# Patient Record
Sex: MALE | Race: WHITE | NOT HISPANIC OR LATINO | URBAN - METROPOLITAN AREA
[De-identification: names, ages, dates, MRNs, and addresses within clinical notes are randomized per-mention and may not be internally consistent; named-entity substitution may affect disease eponyms.]

---

## 2020-12-15 RX ORDER — LISINOPRIL 20 MG/1
20 TABLET ORAL EVERY MORNING
COMMUNITY

## 2020-12-15 RX ORDER — GLUC/MSM/COLGN2/HYAL/ANTIARTH3 375-375-20
TABLET ORAL DAILY
COMMUNITY
End: 2020-12-16 | Stop reason: ENTERED-IN-ERROR

## 2020-12-15 RX ORDER — DONEPEZIL HYDROCHLORIDE 10 MG/1
10 TABLET, FILM COATED ORAL
COMMUNITY

## 2020-12-15 RX ORDER — ROSUVASTATIN CALCIUM 40 MG/1
40 TABLET, COATED ORAL
COMMUNITY

## 2020-12-15 RX ORDER — AMITRIPTYLINE HYDROCHLORIDE 100 MG/1
100 TABLET ORAL NIGHTLY
COMMUNITY

## 2020-12-15 RX ORDER — ASPIRIN 81 MG/1
81 TABLET ORAL DAILY
Status: ON HOLD | COMMUNITY
End: 2020-12-22 | Stop reason: SDUPTHER

## 2020-12-15 RX ORDER — MEMANTINE HYDROCHLORIDE 5 MG/1
5 TABLET ORAL 2 TIMES DAILY
COMMUNITY

## 2020-12-15 RX ORDER — GABAPENTIN 400 MG/1
400 CAPSULE ORAL 3 TIMES DAILY
COMMUNITY

## 2020-12-15 RX ORDER — EZETIMIBE 10 MG/1
10 TABLET ORAL DAILY
COMMUNITY

## 2020-12-15 RX ORDER — SUCRALFATE 1 G/1
1 TABLET ORAL 4 TIMES DAILY
COMMUNITY

## 2020-12-15 RX ORDER — OMEPRAZOLE 20 MG/1
20 CAPSULE, DELAYED RELEASE ORAL
COMMUNITY

## 2020-12-15 RX ORDER — LEVOTHYROXINE SODIUM 75 UG/1
75 TABLET ORAL
COMMUNITY
End: 2020-12-16 | Stop reason: ENTERED-IN-ERROR

## 2020-12-15 RX ORDER — HYDROCODONE BITARTRATE AND ACETAMINOPHEN 10; 325 MG/1; MG/1
1 TABLET ORAL EVERY 4 HOURS PRN
COMMUNITY
End: 2020-12-16 | Stop reason: ENTERED-IN-ERROR

## 2020-12-15 RX ORDER — DULOXETIN HYDROCHLORIDE 30 MG/1
30 CAPSULE, DELAYED RELEASE ORAL 2 TIMES DAILY
COMMUNITY

## 2020-12-15 RX ORDER — CLOPIDOGREL BISULFATE 75 MG/1
75 TABLET ORAL DAILY
Status: ON HOLD | COMMUNITY
End: 2020-12-22 | Stop reason: SDUPTHER

## 2020-12-15 RX ORDER — GLIMEPIRIDE 4 MG/1
4 TABLET ORAL
COMMUNITY

## 2020-12-16 ENCOUNTER — APPOINTMENT (OUTPATIENT)
Dept: LAB | Facility: HOSPITAL | Age: 80
End: 2020-12-16
Attending: ORTHOPAEDIC SURGERY
Payer: MEDICARE

## 2020-12-16 ENCOUNTER — APPOINTMENT (OUTPATIENT)
Dept: PREADMISSION TESTING | Facility: HOSPITAL | Age: 80
End: 2020-12-16
Attending: ORTHOPAEDIC SURGERY
Payer: MEDICARE

## 2020-12-16 ENCOUNTER — TRANSCRIBE ORDERS (OUTPATIENT)
Dept: PREADMISSION TESTING | Facility: HOSPITAL | Age: 80
End: 2020-12-16

## 2020-12-16 VITALS
SYSTOLIC BLOOD PRESSURE: 140 MMHG | OXYGEN SATURATION: 99 % | TEMPERATURE: 97 F | WEIGHT: 193.9 LBS | RESPIRATION RATE: 18 BRPM | HEART RATE: 70 BPM | BODY MASS INDEX: 31.16 KG/M2 | HEIGHT: 66 IN | DIASTOLIC BLOOD PRESSURE: 68 MMHG

## 2020-12-16 DIAGNOSIS — Z11.59 ENCOUNTER FOR SCREENING FOR OTHER VIRAL DISEASES: Primary | ICD-10-CM

## 2020-12-16 DIAGNOSIS — I10 ESSENTIAL HYPERTENSION: ICD-10-CM

## 2020-12-16 DIAGNOSIS — Z11.59 ENCOUNTER FOR SCREENING FOR OTHER VIRAL DISEASES: ICD-10-CM

## 2020-12-16 DIAGNOSIS — E78.49 OTHER HYPERLIPIDEMIA: ICD-10-CM

## 2020-12-16 DIAGNOSIS — M47.12 OTHER SPONDYLOSIS WITH MYELOPATHY, CERVICAL REGION: ICD-10-CM

## 2020-12-16 DIAGNOSIS — M48.02 SPINAL STENOSIS, CERVICAL REGION: ICD-10-CM

## 2020-12-16 DIAGNOSIS — E74.819 DISORDERS OF GLUCOSE TRANSPORT, UNSPECIFIED (CMS/HCC): ICD-10-CM

## 2020-12-16 DIAGNOSIS — Z01.818 ENCOUNTER FOR OTHER PREPROCEDURAL EXAMINATION: ICD-10-CM

## 2020-12-16 DIAGNOSIS — R73.9 HYPERGLYCEMIA: ICD-10-CM

## 2020-12-16 DIAGNOSIS — Z01.818 PREOP EXAMINATION: Primary | ICD-10-CM

## 2020-12-16 PROBLEM — R41.3 MEMORY DISORDER: Status: ACTIVE | Noted: 2020-12-16

## 2020-12-16 PROBLEM — R73.09 ELEVATED HEMOGLOBIN A1C: Status: ACTIVE | Noted: 2020-12-16

## 2020-12-16 PROBLEM — Z86.73 HISTORY OF CVA (CEREBROVASCULAR ACCIDENT): Status: ACTIVE | Noted: 2020-12-16

## 2020-12-16 PROBLEM — E11.9 DM (DIABETES MELLITUS) (CMS/HCC): Status: ACTIVE | Noted: 2020-12-16

## 2020-12-16 PROBLEM — E78.5 HYPERLIPIDEMIA: Status: ACTIVE | Noted: 2020-12-16

## 2020-12-16 LAB
ABO + RH BLD: NORMAL
ANION GAP SERPL CALC-SCNC: 7 MEQ/L (ref 3–15)
BLD GP AB SCN SERPL QL: NEGATIVE
BLOOD BANK CMNT PATIENT-IMP: NORMAL
BUN SERPL-MCNC: 27 MG/DL (ref 8–20)
CALCIUM SERPL-MCNC: 9.5 MG/DL (ref 8.9–10.3)
CHLORIDE SERPL-SCNC: 100 MEQ/L (ref 98–109)
CO2 SERPL-SCNC: 29 MEQ/L (ref 22–32)
CREAT SERPL-MCNC: 1.1 MG/DL (ref 0.8–1.3)
D AG BLD QL: POSITIVE
ERYTHROCYTE [DISTWIDTH] IN BLOOD BY AUTOMATED COUNT: 13.2 % (ref 11.6–14.4)
EST. AVERAGE GLUCOSE BLD GHB EST-MCNC: 194 MG/DL
GFR SERPL CREATININE-BSD FRML MDRD: >60 ML/MIN/1.73M*2
GLUCOSE SERPL-MCNC: 416 MG/DL (ref 70–99)
HBA1C MFR BLD HPLC: 8.4 %
HCT VFR BLDCO AUTO: 43.4 % (ref 40.1–51)
HGB BLD-MCNC: 14 G/DL (ref 13.7–17.5)
LABORATORY COMMENT REPORT: NORMAL
MCH RBC QN AUTO: 30.2 PG (ref 28–33.2)
MCHC RBC AUTO-ENTMCNC: 32.3 G/DL (ref 32.2–36.5)
MCV RBC AUTO: 93.5 FL (ref 83–98)
PDW BLD AUTO: 10.1 FL (ref 9.4–12.4)
PLATELET # BLD AUTO: 216 K/UL (ref 150–350)
POTASSIUM SERPL-SCNC: 4.7 MEQ/L (ref 3.6–5.1)
RBC # BLD AUTO: 4.64 M/UL (ref 4.5–5.8)
SODIUM SERPL-SCNC: 136 MEQ/L (ref 136–144)
SPECIMEN EXP DATE BLD: NORMAL
WBC # BLD AUTO: 8.19 K/UL (ref 3.8–10.5)

## 2020-12-16 PROCEDURE — 83036 HEMOGLOBIN GLYCOSYLATED A1C: CPT

## 2020-12-16 PROCEDURE — 85027 COMPLETE CBC AUTOMATED: CPT

## 2020-12-16 PROCEDURE — 99215 OFFICE O/P EST HI 40 MIN: CPT | Performed by: HOSPITALIST

## 2020-12-16 PROCEDURE — U0003 INFECTIOUS AGENT DETECTION BY NUCLEIC ACID (DNA OR RNA); SEVERE ACUTE RESPIRATORY SYNDROME CORONAVIRUS 2 (SARS-COV-2) (CORONAVIRUS DISEASE [COVID-19]), AMPLIFIED PROBE TECHNIQUE, MAKING USE OF HIGH THROUGHPUT TECHNOLOGIES AS DESCRIBED BY CMS-2020-01-R: HCPCS

## 2020-12-16 PROCEDURE — 36415 COLL VENOUS BLD VENIPUNCTURE: CPT

## 2020-12-16 PROCEDURE — 80048 BASIC METABOLIC PNL TOTAL CA: CPT

## 2020-12-16 PROCEDURE — 86900 BLOOD TYPING SEROLOGIC ABO: CPT

## 2020-12-16 RX ORDER — METOPROLOL SUCCINATE 50 MG/1
50 TABLET, EXTENDED RELEASE ORAL
COMMUNITY

## 2020-12-16 SDOH — HEALTH STABILITY: MENTAL HEALTH: HOW OFTEN DO YOU HAVE A DRINK CONTAINING ALCOHOL?: NEVER

## 2020-12-16 ASSESSMENT — PAIN SCALES - GENERAL: PAINLEVEL: 3

## 2020-12-16 NOTE — ASSESSMENT & PLAN NOTE
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   Pt had been on plavix/asa and plavix was stopped as of yesterday in preparation for the surgery  We will need to obtain clearance letter from dr. Aimee Chavez pt's neurologist before proceeding w surgery. I d/w pt + pt's daughter + RN

## 2020-12-16 NOTE — PRE-PROCEDURE INSTRUCTIONS
1. Admissions will call you with your arrival time on 12/18/2020 (day prior to surgery) between 2pm - 4pm. For questions about your arrival time, please call [phone].    2. Please report to the [facility] in the [place] on the day of your procedure. Enter the hospital through the [facility] (main entrance at [address], [place]). If you are parking in the [facility], come to the ground floor of the garage and follow signs to the Main Hospital. Bring your insurance card and photo ID.     3. Please follow these fasting guidelines:  - STOP all solid food 8 hours prior to arrival.   - No more than 12oz of water is permitted and must STOP 2 HOURS prior to arrival to the hospital.     4. Early on the morning of the procedure, please take the following medications listed below with a sip of water, in addition to any medications prescribed by your surgeon: Memantine, Sucralfate, Gabapentin, Omeprazole, Duloxetine    *NO aspirin, ibuprofen, anti-inflammatories, fish oil or Vitamin E unless ordered by physician.    *Stopped taking Aspirin and Plavix 12/15/2020 per pts daughter(Dr. Delcid aware per daughter)     5. Other instructions: Hibiclens shower x 2(no face or genital area), brush teeth    6. If you develop a cough, cold, fever, or other symptom prior to the date of the procedure, please report it to your physician immediately.    7. If you need to cancel the procedure for any reason, please contact your physician.    8. Make arrangements to have someone drive you home from the procedure. If you have not arranged for transportation home, your surgery may be cancelled.     9. You may not take public transportation or or a cab unless accompanied by a responsible person.    10. You may not drive a car or operate complex or potentially dangerous machinery for 24 hours following anesthesia and/or sedation.    11. If it is medically necessary for you to have a longer stay, you  will be informed as soon as the decision is made.    12. Do not wear or bring anything of value to the hospital, including medication or jewelry of any kind. Do not wear make-up or contact lenses. Do bring your glasses and hearing aid, with a case. If you use a CPAP machine and will be overnight, please bring it with you. If you use any inhalers, please bring them as well.     13. Dress in comfortable clothes.    14. If instructed, please bring a copy of your Advance Directive (Living Will/Durable Power of ) on the day of your procedure.    Pre operative instructions given as per protocol.  Form explained by:

## 2020-12-16 NOTE — ASSESSMENT & PLAN NOTE
Pt's hgba1c back in 6/2020 was 8.1 per pt's PCP -dr. Travis Nieves  hgb a1c is being repeated and is currently pending.

## 2020-12-16 NOTE — ASSESSMENT & PLAN NOTE
Pt's blood sugar today is 416. I called and informed the pt's daughter (pt did not have his phone w him). Instructed her to have pt see Dr. Travis Rich ASAP. Pt's daughter is a nurse and understands that pt will need to be seen by his PCP as soon as possible. Also informed her that the pt's surgery will need to be delayed for the pt's blood sugar to be better controlled before proceeding w surgery. I informed our RN who will notify dr. Delcid's office that the surgery will need to be delayed. I also notified pt's PCP -dr. Travis Rich service and the office indicated that they are currently in the process of following up with the pt regarding his blood sugar and see the pt in the office soon to address his uncontrolled blood sugar.

## 2020-12-16 NOTE — H&P (VIEW-ONLY)
[facility] Medicine Service -  Pre-Operative Consultation       Patient Name: Reji Salvador  Referring Surgeon: dr. boyce    Reason for Referral: Pre-Operative Evaluation  Surgical Procedure: Cervical C3-5 Anterior Decompression, Instrumented Fusion, Autograft, Allograft, Neuromonitoring  Operative Date: 12/21/20  Other Providers:      PCP: Florinda Nieves MD        HISTORY OF PRESENT ILLNESS      Reji Salvador is a 80 y.o. male presenting today to the [facility] at [facility] for pre-operative evaluation prior to planned surgery.  Pt has had paresthesia in b/l UE. After eval, pt found to have c-spine issues. Pt has tried conservative management such as injection therapy without success. Pt has now opted for surgical intervention.     The patient denies any current or recent chest pain or pressure, dyspnea, cough, sputum, fevers, chills, abdominal pain, nausea, vomiting, diarrhea, urinary complaints or blood in stool.     Functionally, the patient is able to ascend a flight or so of stairs with no dyspnea or chest pain.     No history of MI, arrhythmia,or CHF.  No history of RUDY.  No history of DVT/PE.  No history of COPD.  + history of CVA.  No history of DM.   No history of CKD.     PAST MEDICAL AND SURGICAL HISTORY      Past Medical History:   Diagnosis Date   • Bilateral hand pain    • Cervical radiculopathy    • Coronary atherosclerosis    • Fall 09/2020   • Fibromyalgia    • GERD (gastroesophageal reflux disease)    • [place] (hard of hearing)     B/L aids   • Hx of radiation therapy    • Hypertension    • Hypothyroidism    • Lipid disorder    • Memory impairment    • Numbness and tingling in both hands    • Osteoarthritis    • Prostate cancer (CMS/HCC)    • Spinal stenosis in cervical region    • Transient cerebral ischemia    • Tremor    • Type 2 diabetes mellitus (CMS/HCC)     no accuchecks at home       Past Surgical History:   Procedure Laterality Date   •  ANKLE FRACTURE SURGERY Left 09/2020   • CATARACT EXTRACTION W/  INTRAOCULAR LENS IMPLANT Bilateral    • COLONOSCOPY     • TONSILLECTOMY         MEDICATIONS        Current Outpatient Medications:   •  metoprolol succinate XL (TOPROL-XL) 50 mg 24 hr tablet, Take 50 mg by mouth daily with dinner., Disp: , Rfl:   •  amitriptyline (ELAVIL) 100 mg tablet, Take 100 mg by mouth nightly.  , Disp: , Rfl:   •  aspirin 81 mg enteric coated tablet, Take 81 mg by mouth daily., Disp: , Rfl:   •  clopidogreL (PLAVIX) 75 mg tablet, Take 75 mg by mouth daily., Disp: , Rfl:   •  docosahexaenoic acid/epa (FISH OIL ORAL), Take by mouth daily. Pt takes a total 4,000 mg, Disp: , Rfl:   •  donepeziL (ARICEPT) 10 mg tablet, Take 10 mg by mouth daily with dinner.  , Disp: , Rfl:   •  DULoxetine (CYMBALTA) 30 mg capsule, Take 30 mg by mouth 2 (two) times a day.  , Disp: , Rfl:   •  ezetimibe (ZETIA) 10 mg tablet, Take 10 mg by mouth daily., Disp: , Rfl:   •  folic acid/multivit-min/lutein (CENTRUM SILVER ORAL), Take 1 tablet by mouth every morning.  , Disp: , Rfl:   •  gabapentin (NEURONTIN) 400 mg capsule, Take 400 mg by mouth 3 (three) times a day., Disp: , Rfl:   •  glimepiride (AMARYL) 4 mg tablet, Take 4 mg by mouth daily with breakfast., Disp: , Rfl:   •  lisinopriL (PRINIVIL) 20 mg tablet, Take 20 mg by mouth every morning.  , Disp: , Rfl:   •  memantine (NAMENDA) 5 mg tablet, Take 5 mg by mouth 2 (two) times a day., Disp: , Rfl:   •  omeprazole (PriLOSEC) 20 mg capsule, Take 20 mg by mouth 2 (two) times a day before breakfast and dinner.  , Disp: , Rfl:   •  rosuvastatin (CRESTOR) 40 mg tablet, Take 40 mg by mouth daily with dinner.  , Disp: , Rfl:   •  sucralfate (CARAFATE) 1 gram tablet, Take 1 g by mouth 4 (four) times a day., Disp: , Rfl:     ALLERGIES      Patient has no known allergies.    FAMILY HISTORY      family history is not on file.    Denies any prior known family history of DVTs/PEs/clotting disorder    SOCIAL HISTORY  "     Social History     Tobacco Use   • Smoking status: Never Smoker   • Smokeless tobacco: Never Used   Substance Use Topics   • Alcohol use: Never     Frequency: Never   • Drug use: Never       REVIEW OF SYSTEMS      All other systems reviewed and negative except as noted in HPI    PHYSICAL EXAMINATION      Visit Vitals  /68 (BP Location: Left upper arm, Patient Position: Sitting)   Pulse 70   Temp 36.1 °C (97 °F) (Temporal)   Resp 18   Ht 1.676 m (5' 6\")   Wt 88 kg (193 lb 14.4 oz)   SpO2 99% Comment: RA   BMI 31.30 kg/m²     Body mass index is 31.3 kg/m².    Physical Exam  Constitutional:       Appearance: He is not ill-appearing, toxic-appearing or diaphoretic.   Eyes:      General: No scleral icterus.     Extraocular Movements: Extraocular movements intact.   Cardiovascular:      Rate and Rhythm: Regular rhythm.      Heart sounds: No friction rub. No gallop.    Pulmonary:      Effort: Pulmonary effort is normal. No respiratory distress.      Breath sounds: Normal breath sounds. No stridor. No wheezing, rhonchi or rales.   Abdominal:      General: Bowel sounds are normal. There is no distension.      Palpations: Abdomen is soft.      Tenderness: There is no abdominal tenderness. There is no guarding.   Neurological:      General: No focal deficit present.      Mental Status: He is alert and oriented to person, place, and time.   Psychiatric:         Mood and Affect: Mood normal.         Behavior: Behavior normal.         Thought Content: Thought content normal.         LABS / EKG        Labs  Results from last 7 days   Lab Units 12/16/20  1131   WBC K/uL 8.19   HEMOGLOBIN g/dL 14.0   HEMATOCRIT % 43.4   PLATELETS K/uL 216     Results from last 7 days   Lab Units 12/16/20  1131   SODIUM mEQ/L 136   POTASSIUM mEQ/L 4.7   CHLORIDE mEQ/L 100   CO2 mEQ/L 29   BUN mg/dL 27*   CREATININE mg/dL 1.1   CALCIUM mg/dL 9.5   GLUCOSE mg/dL 416*       EKG   EKG from 8/12/20: independently reviewed - sr w hr of 69. No st " elevations.     ASSESSMENT AND PLAN         Encounter for other preprocedural examination  Pt sched for Cervical C3-5 Anterior Decompression, Instrumented Fusion, Autograft, Allograft, Neuromonitoring  Pt has no cardiac symptoms. Pt exhibits no signs/symptoms of angina, arrythmia or decompenstated CHF. Pt has cardiac clearance from dr. Decker -cardiology.   We will also need to obtain neurology clearance from his neurologist -dr. Yu and obtain recommendations regarding how long to hold plavix/when to resume. I d/w pt + RN  Pt's blood sugar today is 416. I called and informed the pt's daughter (pt did not have his phone w him). Instructed her to have pt see his PCP - Dr. Travis MARCUS. Pt's daughter is a nurse and understands that pt will need to be seen by his PCP as soon as possible. Also informed her that the pt's surgery will need to be delayed for the pt's blood sugar to be better controlled before proceeding w surgery. I informed our RN who will notify dr. Delcid's office that the surgery will need to be delayed. I also notified pt's PCP -dr. Travis Rich service regarding pt's blood sugar and the office indicated that they are currently in the process of following up with the pt regarding his blood sugar and see the pt in the office soon to address his uncontrolled blood sugar.       Elevated hemoglobin A1c  Pt's hgba1c back in 6/2020 was 8.1 per pt's PCP -dr. Travis Nieves  hgb a1c is being repeated and is currently pending.     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   Pt had been on plavix/asa and plavix was stopped as of yesterday in preparation for the surgery  We will need to obtain clearance letter from dr. Yu - pt's neurologist before proceeding w surgery. I d/w pt + pt's daughter + RN    DM (diabetes mellitus) (CMS/[facility])  Pt's blood sugar today is 416. I called and  informed the pt's daughter (pt did not have his phone w him). Instructed her to have pt see Dr. Travis Rich ASAP. Pt's daughter is a nurse and understands that pt will need to be seen by his PCP as soon as possible. Also informed her that the pt's surgery will need to be delayed for the pt's blood sugar to be better controlled before proceeding w surgery. I informed our RN who will notify dr. Delcid's office that the surgery will need to be delayed. I also notified pt's PCP -dr. Travis Rich service and the office indicated that they are currently in the process of following up with the pt regarding his blood sugar and see the pt in the office soon to address his uncontrolled blood sugar.       Memory disorder  Pt f/u w dr. Yu - neurology as outpt.   Cont w namenda/aricept    HTN (hypertension)  Cont w bb  Hold ace-I on the morning of surgery    Hyperlipidemia  Cont w statin.             Joselo Brar. Rosi,   12/16/2020

## 2020-12-16 NOTE — ASSESSMENT & PLAN NOTE
Pt sched for Cervical C3-5 Anterior Decompression, Instrumented Fusion, Autograft, Allograft, Neuromonitoring  Pt has no cardiac symptoms. Pt exhibits no signs/symptoms of angina, arrythmia or decompenstated CHF. Pt has cardiac clearance from dr. Deckre -cardiology.   We will also need to obtain neurology clearance from his neurologist -dr. Yu and obtain recommendations regarding how long to hold plavix/when to resume. I d/w pt + RN  Pt's blood sugar today is 416. I called and informed the pt's daughter (pt did not have his phone w him). Instructed her to have pt see his PCP - Dr. Travis Rich ASAP. Pt's daughter is a nurse and understands that pt will need to be seen by his PCP as soon as possible. Also informed her that the pt's surgery will need to be delayed for the pt's blood sugar to be better controlled before proceeding w surgery. I informed our RN who will notify dr. Delcid's office that the surgery will need to be delayed. I also notified pt's PCP -dr. Travis Rich service regarding pt's blood sugar and the office indicated that they are currently in the process of following up with the pt regarding his blood sugar and see the pt in the office soon to address his uncontrolled blood sugar.

## 2020-12-16 NOTE — CONSULTS
[facility] Medicine Service -  Pre-Operative Consultation       Patient Name: Reji Salvador  Referring Surgeon: dr. boyce    Reason for Referral: Pre-Operative Evaluation  Surgical Procedure: Cervical C3-5 Anterior Decompression, Instrumented Fusion, Autograft, Allograft, Neuromonitoring  Operative Date: 12/21/20  Other Providers:      PCP: Florinda Nieves MD        HISTORY OF PRESENT ILLNESS      Reji Salvador is a 80 y.o. male presenting today to the [facility] at [facility] for pre-operative evaluation prior to planned surgery.  Pt has had paresthesia in b/l UE. After eval, pt found to have c-spine issues. Pt has tried conservative management such as injection therapy without success. Pt has now opted for surgical intervention.     The patient denies any current or recent chest pain or pressure, dyspnea, cough, sputum, fevers, chills, abdominal pain, nausea, vomiting, diarrhea, urinary complaints or blood in stool.     Functionally, the patient is able to ascend a flight or so of stairs with no dyspnea or chest pain.     No history of MI, arrhythmia,or CHF.  No history of RUDY.  No history of DVT/PE.  No history of COPD.  + history of CVA.  No history of DM.   No history of CKD.     PAST MEDICAL AND SURGICAL HISTORY      Past Medical History:   Diagnosis Date   • Bilateral hand pain    • Cervical radiculopathy    • Coronary atherosclerosis    • Fall 09/2020   • Fibromyalgia    • GERD (gastroesophageal reflux disease)    • [place] (hard of hearing)     B/L aids   • Hx of radiation therapy    • Hypertension    • Hypothyroidism    • Lipid disorder    • Memory impairment    • Numbness and tingling in both hands    • Osteoarthritis    • Prostate cancer (CMS/HCC)    • Spinal stenosis in cervical region    • Transient cerebral ischemia    • Tremor    • Type 2 diabetes mellitus (CMS/HCC)     no accuchecks at home       Past Surgical History:   Procedure Laterality Date   •  ANKLE FRACTURE SURGERY Left 09/2020   • CATARACT EXTRACTION W/  INTRAOCULAR LENS IMPLANT Bilateral    • COLONOSCOPY     • TONSILLECTOMY         MEDICATIONS        Current Outpatient Medications:   •  metoprolol succinate XL (TOPROL-XL) 50 mg 24 hr tablet, Take 50 mg by mouth daily with dinner., Disp: , Rfl:   •  amitriptyline (ELAVIL) 100 mg tablet, Take 100 mg by mouth nightly.  , Disp: , Rfl:   •  aspirin 81 mg enteric coated tablet, Take 81 mg by mouth daily., Disp: , Rfl:   •  clopidogreL (PLAVIX) 75 mg tablet, Take 75 mg by mouth daily., Disp: , Rfl:   •  docosahexaenoic acid/epa (FISH OIL ORAL), Take by mouth daily. Pt takes a total 4,000 mg, Disp: , Rfl:   •  donepeziL (ARICEPT) 10 mg tablet, Take 10 mg by mouth daily with dinner.  , Disp: , Rfl:   •  DULoxetine (CYMBALTA) 30 mg capsule, Take 30 mg by mouth 2 (two) times a day.  , Disp: , Rfl:   •  ezetimibe (ZETIA) 10 mg tablet, Take 10 mg by mouth daily., Disp: , Rfl:   •  folic acid/multivit-min/lutein (CENTRUM SILVER ORAL), Take 1 tablet by mouth every morning.  , Disp: , Rfl:   •  gabapentin (NEURONTIN) 400 mg capsule, Take 400 mg by mouth 3 (three) times a day., Disp: , Rfl:   •  glimepiride (AMARYL) 4 mg tablet, Take 4 mg by mouth daily with breakfast., Disp: , Rfl:   •  lisinopriL (PRINIVIL) 20 mg tablet, Take 20 mg by mouth every morning.  , Disp: , Rfl:   •  memantine (NAMENDA) 5 mg tablet, Take 5 mg by mouth 2 (two) times a day., Disp: , Rfl:   •  omeprazole (PriLOSEC) 20 mg capsule, Take 20 mg by mouth 2 (two) times a day before breakfast and dinner.  , Disp: , Rfl:   •  rosuvastatin (CRESTOR) 40 mg tablet, Take 40 mg by mouth daily with dinner.  , Disp: , Rfl:   •  sucralfate (CARAFATE) 1 gram tablet, Take 1 g by mouth 4 (four) times a day., Disp: , Rfl:     ALLERGIES      Patient has no known allergies.    FAMILY HISTORY      family history is not on file.    Denies any prior known family history of DVTs/PEs/clotting disorder    SOCIAL HISTORY  "     Social History     Tobacco Use   • Smoking status: Never Smoker   • Smokeless tobacco: Never Used   Substance Use Topics   • Alcohol use: Never     Frequency: Never   • Drug use: Never       REVIEW OF SYSTEMS      All other systems reviewed and negative except as noted in HPI    PHYSICAL EXAMINATION      Visit Vitals  /68 (BP Location: Left upper arm, Patient Position: Sitting)   Pulse 70   Temp 36.1 °C (97 °F) (Temporal)   Resp 18   Ht 1.676 m (5' 6\")   Wt 88 kg (193 lb 14.4 oz)   SpO2 99% Comment: RA   BMI 31.30 kg/m²     Body mass index is 31.3 kg/m².    Physical Exam  Constitutional:       Appearance: He is not ill-appearing, toxic-appearing or diaphoretic.   Eyes:      General: No scleral icterus.     Extraocular Movements: Extraocular movements intact.   Cardiovascular:      Rate and Rhythm: Regular rhythm.      Heart sounds: No friction rub. No gallop.    Pulmonary:      Effort: Pulmonary effort is normal. No respiratory distress.      Breath sounds: Normal breath sounds. No stridor. No wheezing, rhonchi or rales.   Abdominal:      General: Bowel sounds are normal. There is no distension.      Palpations: Abdomen is soft.      Tenderness: There is no abdominal tenderness. There is no guarding.   Neurological:      General: No focal deficit present.      Mental Status: He is alert and oriented to person, place, and time.   Psychiatric:         Mood and Affect: Mood normal.         Behavior: Behavior normal.         Thought Content: Thought content normal.         LABS / EKG        Labs  Results from last 7 days   Lab Units 12/16/20  1131   WBC K/uL 8.19   HEMOGLOBIN g/dL 14.0   HEMATOCRIT % 43.4   PLATELETS K/uL 216     Results from last 7 days   Lab Units 12/16/20  1131   SODIUM mEQ/L 136   POTASSIUM mEQ/L 4.7   CHLORIDE mEQ/L 100   CO2 mEQ/L 29   BUN mg/dL 27*   CREATININE mg/dL 1.1   CALCIUM mg/dL 9.5   GLUCOSE mg/dL 416*       EKG   EKG from 8/12/20: independently reviewed - sr w hr of 69. No st " elevations.     ASSESSMENT AND PLAN         Encounter for other preprocedural examination  Pt sched for Cervical C3-5 Anterior Decompression, Instrumented Fusion, Autograft, Allograft, Neuromonitoring  Pt has no cardiac symptoms. Pt exhibits no signs/symptoms of angina, arrythmia or decompenstated CHF. Pt has cardiac clearance from dr. Decker -cardiology.   We will also need to obtain neurology clearance from his neurologist -dr. Yu and obtain recommendations regarding how long to hold plavix/when to resume. I d/w pt + RN  Pt's blood sugar today is 416. I called and informed the pt's daughter (pt did not have his phone w him). Instructed her to have pt see his PCP - Dr. Travis MARCUS. Pt's daughter is a nurse and understands that pt will need to be seen by his PCP as soon as possible. Also informed her that the pt's surgery will need to be delayed for the pt's blood sugar to be better controlled before proceeding w surgery. I informed our RN who will notify dr. Delcid's office that the surgery will need to be delayed. I also notified pt's PCP -dr. Travis Rich service regarding pt's blood sugar and the office indicated that they are currently in the process of following up with the pt regarding his blood sugar and see the pt in the office soon to address his uncontrolled blood sugar.       Elevated hemoglobin A1c  Pt's hgba1c back in 6/2020 was 8.1 per pt's PCP -dr. Travis Nieves  hgb a1c is being repeated and is currently pending.     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   Pt had been on plavix/asa and plavix was stopped as of yesterday in preparation for the surgery  We will need to obtain clearance letter from dr. Yu - pt's neurologist before proceeding w surgery. I d/w pt + pt's daughter + RN    DM (diabetes mellitus) (CMS/[facility])  Pt's blood sugar today is 416. I called and  informed the pt's daughter (pt did not have his phone w him). Instructed her to have pt see Dr. Travis Rich ASAP. Pt's daughter is a nurse and understands that pt will need to be seen by his PCP as soon as possible. Also informed her that the pt's surgery will need to be delayed for the pt's blood sugar to be better controlled before proceeding w surgery. I informed our RN who will notify dr. Delcid's office that the surgery will need to be delayed. I also notified pt's PCP -dr. Travis Rich service and the office indicated that they are currently in the process of following up with the pt regarding his blood sugar and see the pt in the office soon to address his uncontrolled blood sugar.       Memory disorder  Pt f/u w dr. Yu - neurology as outpt.   Cont w namenda/aricept    HTN (hypertension)  Cont w bb  Hold ace-I on the morning of surgery    Hyperlipidemia  Cont w statin.             Joselo Brar. Rosi,   12/16/2020

## 2020-12-17 LAB — SARS-COV-2 RNA RESP QL NAA+PROBE: NOT DETECTED

## 2020-12-18 ENCOUNTER — ANESTHESIA EVENT (OUTPATIENT)
Dept: OPERATING ROOM | Facility: HOSPITAL | Age: 80
Setting detail: HOSPITAL OUTPATIENT SURGERY
End: 2020-12-18
Payer: MEDICARE

## 2020-12-18 NOTE — ANESTHESIOLOGIST PRE-PROCEDURE CHART REVIEW
I confirm that I have reviewed the available information in the medical record prior to the scheduled surgery. Aware of  on preop labs.  Hemoglobin A1C is 8.1. Discussed case with Dr. Delcid this morning.  Dr. Delcid states that the patient has severe spinal stenosis with myelopathic symptoms and that the risk of postop infection is low. He is categorizing the procedure as urgent.  The patient will need a repeat blood glucose on admission.

## 2020-12-21 ENCOUNTER — APPOINTMENT (OUTPATIENT)
Dept: RADIOLOGY | Facility: HOSPITAL | Age: 80
Setting detail: HOSPITAL OUTPATIENT SURGERY
End: 2020-12-21
Attending: ORTHOPAEDIC SURGERY
Payer: MEDICARE

## 2020-12-21 ENCOUNTER — ANESTHESIA (OUTPATIENT)
Dept: OPERATING ROOM | Facility: HOSPITAL | Age: 80
Setting detail: HOSPITAL OUTPATIENT SURGERY
End: 2020-12-21
Payer: MEDICARE

## 2020-12-21 ENCOUNTER — HOSPITAL ENCOUNTER (OUTPATIENT)
Facility: HOSPITAL | Age: 80
LOS: 1 days | Discharge: HOME | End: 2020-12-22
Attending: ORTHOPAEDIC SURGERY | Admitting: ORTHOPAEDIC SURGERY
Payer: MEDICARE

## 2020-12-21 DIAGNOSIS — Z41.9 SURGERY, ELECTIVE: Primary | ICD-10-CM

## 2020-12-21 PROBLEM — R73.9 HYPERGLYCEMIA: Status: ACTIVE | Noted: 2020-12-21

## 2020-12-21 PROBLEM — Z98.1 STATUS POST CERVICAL SPINAL FUSION: Status: ACTIVE | Noted: 2020-12-21

## 2020-12-21 LAB
ABO + RH BLD: NORMAL
D AG BLD QL: POSITIVE
GLUCOSE BLD-MCNC: 172 MG/DL (ref 70–99)
GLUCOSE BLD-MCNC: 177 MG/DL (ref 70–99)
GLUCOSE BLD-MCNC: 183 MG/DL (ref 70–99)
GLUCOSE BLD-MCNC: 197 MG/DL (ref 70–99)
LABORATORY COMMENT REPORT: NORMAL
POCT TEST: ABNORMAL

## 2020-12-21 PROCEDURE — 0RG1070 FUSION OF CERVICAL VERTEBRAL JOINT WITH AUTOLOGOUS TISSUE SUBSTITUTE, ANTERIOR APPROACH, ANTERIOR COLUMN, OPEN APPROACH: ICD-10-PCS | Performed by: ORTHOPAEDIC SURGERY

## 2020-12-21 PROCEDURE — 36000014 HC OR LEVEL 4 EA ADDL MIN: Performed by: ORTHOPAEDIC SURGERY

## 2020-12-21 PROCEDURE — 27200000 HC STERILE SUPPLY: Performed by: ORTHOPAEDIC SURGERY

## 2020-12-21 PROCEDURE — 25800000 HC PHARMACY IV SOLUTIONS: Performed by: NURSE PRACTITIONER

## 2020-12-21 PROCEDURE — 36000004 HC OR LEVEL 4 INITIAL 30MIN: Performed by: ORTHOPAEDIC SURGERY

## 2020-12-21 PROCEDURE — 36103120 FL FLUOROSCOPY TECHNICAL ASSISTANCE

## 2020-12-21 PROCEDURE — 37000001 HC ANESTHESIA GENERAL: Performed by: ORTHOPAEDIC SURGERY

## 2020-12-21 PROCEDURE — 27800000 HC SUPPLY/IMPLANTS: Performed by: ORTHOPAEDIC SURGERY

## 2020-12-21 PROCEDURE — 71000001 HC PACU PHASE 1 INITIAL 30MIN: Performed by: ORTHOPAEDIC SURGERY

## 2020-12-21 PROCEDURE — 63600000 HC DRUGS/DETAIL CODE: Performed by: ANESTHESIOLOGY

## 2020-12-21 PROCEDURE — 72040 X-RAY EXAM NECK SPINE 2-3 VW: CPT

## 2020-12-21 PROCEDURE — 36415 COLL VENOUS BLD VENIPUNCTURE: CPT | Performed by: ORTHOPAEDIC SURGERY

## 2020-12-21 PROCEDURE — 99225 PR SBSQ OBSERVATION CARE/DAY 25 MINUTES: CPT | Performed by: HOSPITALIST

## 2020-12-21 PROCEDURE — 25000000 HC PHARMACY GENERAL: Performed by: ANESTHESIOLOGY

## 2020-12-21 PROCEDURE — 0RT30ZZ RESECTION OF CERVICAL VERTEBRAL DISC, OPEN APPROACH: ICD-10-PCS | Performed by: ORTHOPAEDIC SURGERY

## 2020-12-21 PROCEDURE — 63600000 HC DRUGS/DETAIL CODE: Performed by: NURSE PRACTITIONER

## 2020-12-21 PROCEDURE — 97161 PT EVAL LOW COMPLEX 20 MIN: CPT | Mod: GP

## 2020-12-21 PROCEDURE — C1713 ANCHOR/SCREW BN/BN,TIS/BN: HCPCS | Performed by: ORTHOPAEDIC SURGERY

## 2020-12-21 PROCEDURE — L0120 CERV FLEX N/ADJ FOAM PRE OTS: HCPCS | Performed by: ORTHOPAEDIC SURGERY

## 2020-12-21 PROCEDURE — 63700000 HC SELF-ADMINISTRABLE DRUG: Performed by: NURSE PRACTITIONER

## 2020-12-21 PROCEDURE — 25800000 HC PHARMACY IV SOLUTIONS: Performed by: ANESTHESIOLOGY

## 2020-12-21 PROCEDURE — 63700000 HC SELF-ADMINISTRABLE DRUG: Performed by: ORTHOPAEDIC SURGERY

## 2020-12-21 PROCEDURE — 71000011 HC PACU PHASE 1 EA ADDL MIN: Performed by: ORTHOPAEDIC SURGERY

## 2020-12-21 PROCEDURE — 97166 OT EVAL MOD COMPLEX 45 MIN: CPT | Mod: GO

## 2020-12-21 DEVICE — CERV SPACER 7° 11X14X7MM ATRIX-C: Type: IMPLANTABLE DEVICE | Site: CLAVICLE | Status: FUNCTIONAL

## 2020-12-21 DEVICE — IMPLANTABLE DEVICE: Type: IMPLANTABLE DEVICE | Site: CLAVICLE | Status: FUNCTIONAL

## 2020-12-21 DEVICE — SCREW 4.0 X 14MM SEMI CONSTRAINED: Type: IMPLANTABLE DEVICE | Site: CLAVICLE | Status: FUNCTIONAL

## 2020-12-21 RX ORDER — DEXTROSE 40 %
15-30 GEL (GRAM) ORAL AS NEEDED
Status: DISCONTINUED | OUTPATIENT
Start: 2020-12-21 | End: 2020-12-22 | Stop reason: HOSPADM

## 2020-12-21 RX ORDER — FENTANYL CITRATE 50 UG/ML
INJECTION, SOLUTION INTRAMUSCULAR; INTRAVENOUS AS NEEDED
Status: DISCONTINUED | OUTPATIENT
Start: 2020-12-21 | End: 2020-12-21 | Stop reason: SURG

## 2020-12-21 RX ORDER — AMOXICILLIN 250 MG
1 CAPSULE ORAL 2 TIMES DAILY
Status: DISCONTINUED | OUTPATIENT
Start: 2020-12-21 | End: 2020-12-22 | Stop reason: HOSPADM

## 2020-12-21 RX ORDER — CEFAZOLIN SODIUM 2 G/50ML
2 SOLUTION INTRAVENOUS
Status: DISCONTINUED | OUTPATIENT
Start: 2020-12-21 | End: 2020-12-21

## 2020-12-21 RX ORDER — SODIUM CHLORIDE 9 MG/ML
INJECTION, SOLUTION INTRAVENOUS CONTINUOUS
Status: DISCONTINUED | OUTPATIENT
Start: 2020-12-21 | End: 2020-12-22 | Stop reason: HOSPADM

## 2020-12-21 RX ORDER — MEMANTINE HYDROCHLORIDE 10 MG/1
5 TABLET ORAL 2 TIMES DAILY
Status: DISCONTINUED | OUTPATIENT
Start: 2020-12-21 | End: 2020-12-22 | Stop reason: HOSPADM

## 2020-12-21 RX ORDER — DIPHENHYDRAMINE HCL 50 MG/ML
25 VIAL (ML) INJECTION EVERY 6 HOURS PRN
Status: DISCONTINUED | OUTPATIENT
Start: 2020-12-21 | End: 2020-12-22 | Stop reason: HOSPADM

## 2020-12-21 RX ORDER — GABAPENTIN 400 MG/1
400 CAPSULE ORAL 3 TIMES DAILY
Status: DISCONTINUED | OUTPATIENT
Start: 2020-12-21 | End: 2020-12-22 | Stop reason: HOSPADM

## 2020-12-21 RX ORDER — ACETAMINOPHEN 325 MG/1
TABLET ORAL
Status: DISPENSED
Start: 2020-12-21 | End: 2020-12-21

## 2020-12-21 RX ORDER — INSULIN ASPART 100 [IU]/ML
2-8 INJECTION, SOLUTION INTRAVENOUS; SUBCUTANEOUS
Status: DISCONTINUED | OUTPATIENT
Start: 2020-12-21 | End: 2020-12-22 | Stop reason: HOSPADM

## 2020-12-21 RX ORDER — CYCLOBENZAPRINE HCL 10 MG
TABLET ORAL
Status: DISPENSED
Start: 2020-12-21 | End: 2020-12-21

## 2020-12-21 RX ORDER — CEFAZOLIN SODIUM 2 G/50ML
SOLUTION INTRAVENOUS
Status: DISPENSED
Start: 2020-12-21 | End: 2020-12-21

## 2020-12-21 RX ORDER — IBUPROFEN 200 MG
16-32 TABLET ORAL AS NEEDED
Status: DISCONTINUED | OUTPATIENT
Start: 2020-12-21 | End: 2020-12-21

## 2020-12-21 RX ORDER — DULOXETIN HYDROCHLORIDE 30 MG/1
30 CAPSULE, DELAYED RELEASE ORAL 2 TIMES DAILY
Status: DISCONTINUED | OUTPATIENT
Start: 2020-12-21 | End: 2020-12-22 | Stop reason: HOSPADM

## 2020-12-21 RX ORDER — ONDANSETRON 4 MG/1
4 TABLET, ORALLY DISINTEGRATING ORAL EVERY 8 HOURS PRN
Status: DISCONTINUED | OUTPATIENT
Start: 2020-12-21 | End: 2020-12-22 | Stop reason: HOSPADM

## 2020-12-21 RX ORDER — OXYCODONE HYDROCHLORIDE 5 MG/1
5-10 TABLET ORAL EVERY 4 HOURS PRN
Status: DISCONTINUED | OUTPATIENT
Start: 2020-12-21 | End: 2020-12-22 | Stop reason: HOSPADM

## 2020-12-21 RX ORDER — EZETIMIBE 10 MG/1
10 TABLET ORAL DAILY
Status: DISCONTINUED | OUTPATIENT
Start: 2020-12-21 | End: 2020-12-22 | Stop reason: HOSPADM

## 2020-12-21 RX ORDER — DEXTROSE 50 % IN WATER (D50W) INTRAVENOUS SYRINGE
25 AS NEEDED
Status: DISCONTINUED | OUTPATIENT
Start: 2020-12-21 | End: 2020-12-22 | Stop reason: HOSPADM

## 2020-12-21 RX ORDER — PANTOPRAZOLE SODIUM 20 MG/1
20 TABLET, DELAYED RELEASE ORAL DAILY
Status: DISCONTINUED | OUTPATIENT
Start: 2020-12-21 | End: 2020-12-22 | Stop reason: HOSPADM

## 2020-12-21 RX ORDER — GLIMEPIRIDE 4 MG/1
4 TABLET ORAL
Status: DISCONTINUED | OUTPATIENT
Start: 2020-12-22 | End: 2020-12-22 | Stop reason: HOSPADM

## 2020-12-21 RX ORDER — PROPOFOL 10 MG/ML
INJECTION, EMULSION INTRAVENOUS AS NEEDED
Status: DISCONTINUED | OUTPATIENT
Start: 2020-12-21 | End: 2020-12-21 | Stop reason: SURG

## 2020-12-21 RX ORDER — ACETAMINOPHEN 325 MG/1
650 TABLET ORAL
Status: DISCONTINUED | OUTPATIENT
Start: 2020-12-21 | End: 2020-12-22 | Stop reason: HOSPADM

## 2020-12-21 RX ORDER — FENTANYL CITRATE 50 UG/ML
50 INJECTION, SOLUTION INTRAMUSCULAR; INTRAVENOUS
Status: DISCONTINUED | OUTPATIENT
Start: 2020-12-21 | End: 2020-12-21

## 2020-12-21 RX ORDER — POLYETHYLENE GLYCOL 3350 17 G/17G
17 POWDER, FOR SOLUTION ORAL DAILY
Status: DISCONTINUED | OUTPATIENT
Start: 2020-12-21 | End: 2020-12-22 | Stop reason: HOSPADM

## 2020-12-21 RX ORDER — METOPROLOL SUCCINATE 50 MG/1
50 TABLET, EXTENDED RELEASE ORAL
Status: DISCONTINUED | OUTPATIENT
Start: 2020-12-21 | End: 2020-12-22 | Stop reason: HOSPADM

## 2020-12-21 RX ORDER — ONDANSETRON HYDROCHLORIDE 2 MG/ML
4 INJECTION, SOLUTION INTRAVENOUS EVERY 8 HOURS PRN
Status: DISCONTINUED | OUTPATIENT
Start: 2020-12-21 | End: 2020-12-22 | Stop reason: HOSPADM

## 2020-12-21 RX ORDER — BACLOFEN 10 MG/1
10 TABLET ORAL EVERY 8 HOURS PRN
Status: DISCONTINUED | OUTPATIENT
Start: 2020-12-21 | End: 2020-12-22 | Stop reason: HOSPADM

## 2020-12-21 RX ORDER — DONEPEZIL HYDROCHLORIDE 10 MG/1
10 TABLET, FILM COATED ORAL
Status: DISCONTINUED | OUTPATIENT
Start: 2020-12-21 | End: 2020-12-22 | Stop reason: HOSPADM

## 2020-12-21 RX ORDER — AMITRIPTYLINE HYDROCHLORIDE 50 MG/1
100 TABLET, FILM COATED ORAL NIGHTLY
Status: DISCONTINUED | OUTPATIENT
Start: 2020-12-21 | End: 2020-12-22 | Stop reason: HOSPADM

## 2020-12-21 RX ORDER — IBUPROFEN 200 MG
16-32 TABLET ORAL AS NEEDED
Status: DISCONTINUED | OUTPATIENT
Start: 2020-12-21 | End: 2020-12-22 | Stop reason: HOSPADM

## 2020-12-21 RX ORDER — HEPARIN SODIUM 5000 [USP'U]/ML
5000 INJECTION, SOLUTION INTRAVENOUS; SUBCUTANEOUS EVERY 8 HOURS
Status: DISCONTINUED | OUTPATIENT
Start: 2020-12-23 | End: 2020-12-22 | Stop reason: HOSPADM

## 2020-12-21 RX ORDER — EPHEDRINE SULFATE 50 MG/ML
INJECTION, SOLUTION INTRAVENOUS AS NEEDED
Status: DISCONTINUED | OUTPATIENT
Start: 2020-12-21 | End: 2020-12-21 | Stop reason: SURG

## 2020-12-21 RX ORDER — CEFAZOLIN SODIUM 1 G/50ML
SOLUTION INTRAVENOUS AS NEEDED
Status: DISCONTINUED | OUTPATIENT
Start: 2020-12-21 | End: 2020-12-21 | Stop reason: SURG

## 2020-12-21 RX ORDER — LISINOPRIL 20 MG/1
20 TABLET ORAL EVERY MORNING
Status: DISCONTINUED | OUTPATIENT
Start: 2020-12-21 | End: 2020-12-22 | Stop reason: HOSPADM

## 2020-12-21 RX ORDER — HYDROMORPHONE HYDROCHLORIDE 1 MG/ML
INJECTION, SOLUTION INTRAMUSCULAR; INTRAVENOUS; SUBCUTANEOUS AS NEEDED
Status: DISCONTINUED | OUTPATIENT
Start: 2020-12-21 | End: 2020-12-21 | Stop reason: SURG

## 2020-12-21 RX ORDER — SODIUM CHLORIDE 9 MG/ML
INJECTION, SOLUTION INTRAVENOUS CONTINUOUS PRN
Status: DISCONTINUED | OUTPATIENT
Start: 2020-12-21 | End: 2020-12-21 | Stop reason: SURG

## 2020-12-21 RX ORDER — ALUMINUM HYDROXIDE, MAGNESIUM HYDROXIDE, AND SIMETHICONE 1200; 120; 1200 MG/30ML; MG/30ML; MG/30ML
30 SUSPENSION ORAL EVERY 4 HOURS PRN
Status: DISCONTINUED | OUTPATIENT
Start: 2020-12-21 | End: 2020-12-22 | Stop reason: HOSPADM

## 2020-12-21 RX ORDER — ACETAMINOPHEN 325 MG/1
975 TABLET ORAL
Status: COMPLETED | OUTPATIENT
Start: 2020-12-21 | End: 2020-12-21

## 2020-12-21 RX ORDER — SODIUM CHLORIDE, SODIUM GLUCONATE, SODIUM ACETATE, POTASSIUM CHLORIDE AND MAGNESIUM CHLORIDE 30; 37; 368; 526; 502 MG/100ML; MG/100ML; MG/100ML; MG/100ML; MG/100ML
INJECTION, SOLUTION INTRAVENOUS CONTINUOUS PRN
Status: DISCONTINUED | OUTPATIENT
Start: 2020-12-21 | End: 2020-12-21 | Stop reason: SURG

## 2020-12-21 RX ORDER — BISACODYL 10 MG/1
10 SUPPOSITORY RECTAL DAILY PRN
Status: DISCONTINUED | OUTPATIENT
Start: 2020-12-21 | End: 2020-12-22 | Stop reason: HOSPADM

## 2020-12-21 RX ORDER — ONDANSETRON HYDROCHLORIDE 2 MG/ML
4 INJECTION, SOLUTION INTRAVENOUS
Status: DISCONTINUED | OUTPATIENT
Start: 2020-12-21 | End: 2020-12-21

## 2020-12-21 RX ORDER — DEXTROSE 50 % IN WATER (D50W) INTRAVENOUS SYRINGE
25 AS NEEDED
Status: DISCONTINUED | OUTPATIENT
Start: 2020-12-21 | End: 2020-12-21

## 2020-12-21 RX ORDER — LIDOCAINE HYDROCHLORIDE 10 MG/ML
INJECTION, SOLUTION INFILTRATION; PERINEURAL AS NEEDED
Status: DISCONTINUED | OUTPATIENT
Start: 2020-12-21 | End: 2020-12-21 | Stop reason: SURG

## 2020-12-21 RX ORDER — DIPHENHYDRAMINE HCL 25 MG
25 CAPSULE ORAL EVERY 6 HOURS PRN
Status: DISCONTINUED | OUTPATIENT
Start: 2020-12-21 | End: 2020-12-22 | Stop reason: HOSPADM

## 2020-12-21 RX ORDER — ROSUVASTATIN CALCIUM 40 MG/1
40 TABLET, COATED ORAL
Status: DISCONTINUED | OUTPATIENT
Start: 2020-12-21 | End: 2020-12-22 | Stop reason: HOSPADM

## 2020-12-21 RX ORDER — ONDANSETRON HYDROCHLORIDE 2 MG/ML
INJECTION, SOLUTION INTRAVENOUS AS NEEDED
Status: DISCONTINUED | OUTPATIENT
Start: 2020-12-21 | End: 2020-12-21 | Stop reason: SURG

## 2020-12-21 RX ORDER — PHENYLEPHRINE HCL IN 0.9% NACL 50MG/250ML
10-200 PLASTIC BAG, INJECTION (ML) INTRAVENOUS
Status: DISCONTINUED | OUTPATIENT
Start: 2020-12-21 | End: 2020-12-21

## 2020-12-21 RX ORDER — HYDROMORPHONE HYDROCHLORIDE 1 MG/ML
0.5 INJECTION, SOLUTION INTRAMUSCULAR; INTRAVENOUS; SUBCUTANEOUS
Status: DISCONTINUED | OUTPATIENT
Start: 2020-12-21 | End: 2020-12-21

## 2020-12-21 RX ORDER — DEXTROSE 40 %
15-30 GEL (GRAM) ORAL AS NEEDED
Status: DISCONTINUED | OUTPATIENT
Start: 2020-12-21 | End: 2020-12-21

## 2020-12-21 RX ORDER — PROPOFOL 10 MG/ML
INJECTION, EMULSION INTRAVENOUS CONTINUOUS PRN
Status: DISCONTINUED | OUTPATIENT
Start: 2020-12-21 | End: 2020-12-21 | Stop reason: SURG

## 2020-12-21 RX ORDER — CYCLOBENZAPRINE HCL 10 MG
10 TABLET ORAL
Status: COMPLETED | OUTPATIENT
Start: 2020-12-21 | End: 2020-12-21

## 2020-12-21 RX ADMIN — SODIUM CHLORIDE: 9 INJECTION, SOLUTION INTRAVENOUS at 16:04

## 2020-12-21 RX ADMIN — SODIUM CHLORIDE, SODIUM GLUCONATE, SODIUM ACETATE, POTASSIUM CHLORIDE AND MAGNESIUM CHLORIDE: 526; 502; 368; 37; 30 INJECTION, SOLUTION INTRAVENOUS at 10:37

## 2020-12-21 RX ADMIN — LIDOCAINE HYDROCHLORIDE 5 ML: 10 INJECTION, SOLUTION INFILTRATION; PERINEURAL at 10:28

## 2020-12-21 RX ADMIN — REMIFENTANIL HYDROCHLORIDE 0.2 MCG/KG/MIN: 1 INJECTION, POWDER, LYOPHILIZED, FOR SOLUTION INTRAVENOUS at 10:32

## 2020-12-21 RX ADMIN — OXYCODONE HYDROCHLORIDE 10 MG: 5 TABLET ORAL at 22:10

## 2020-12-21 RX ADMIN — EZETIMIBE 10 MG: 10 TABLET ORAL at 17:15

## 2020-12-21 RX ADMIN — BENZOCAINE AND MENTHOL 1 LOZENGE: 15; 3.6 LOZENGE ORAL at 17:11

## 2020-12-21 RX ADMIN — GABAPENTIN 400 MG: 400 CAPSULE ORAL at 16:25

## 2020-12-21 RX ADMIN — EPHEDRINE SULFATE 10 MG: 50 INJECTION INTRAVENOUS at 12:23

## 2020-12-21 RX ADMIN — PHENYLEPHRINE HYDROCHLORIDE 50 MCG/MIN: 10 INJECTION INTRAVENOUS at 10:32

## 2020-12-21 RX ADMIN — SODIUM CHLORIDE: 900 INJECTION, SOLUTION INTRAVENOUS at 10:11

## 2020-12-21 RX ADMIN — OXYCODONE HYDROCHLORIDE 5 MG: 5 TABLET ORAL at 21:09

## 2020-12-21 RX ADMIN — EPHEDRINE SULFATE 10 MG: 50 INJECTION INTRAVENOUS at 13:08

## 2020-12-21 RX ADMIN — FENTANYL CITRATE 100 MCG: 50 INJECTION, SOLUTION INTRAMUSCULAR; INTRAVENOUS at 10:27

## 2020-12-21 RX ADMIN — ROSUVASTATIN CALCIUM 40 MG: 40 TABLET, FILM COATED ORAL at 17:15

## 2020-12-21 RX ADMIN — DONEPEZIL HYDROCHLORIDE 10 MG: 10 TABLET ORAL at 17:15

## 2020-12-21 RX ADMIN — SENNOSIDES, DOCUSATE SODIUM 1 TABLET: 8.6; 5 TABLET ORAL at 20:01

## 2020-12-21 RX ADMIN — ACETAMINOPHEN 650 MG: 325 TABLET, FILM COATED ORAL at 16:25

## 2020-12-21 RX ADMIN — ONDANSETRON 4 MG: 2 INJECTION INTRAMUSCULAR; INTRAVENOUS at 12:58

## 2020-12-21 RX ADMIN — ACETAMINOPHEN 975 MG: 325 TABLET, FILM COATED ORAL at 08:51

## 2020-12-21 RX ADMIN — CEFAZOLIN 2 G: 330 INJECTION, POWDER, FOR SOLUTION INTRAMUSCULAR; INTRAVENOUS at 17:14

## 2020-12-21 RX ADMIN — MEMANTINE 5 MG: 10 TABLET ORAL at 20:01

## 2020-12-21 RX ADMIN — CEFAZOLIN SODIUM 2 G: 1 SOLUTION INTRAVENOUS at 10:34

## 2020-12-21 RX ADMIN — GABAPENTIN 400 MG: 400 CAPSULE ORAL at 20:01

## 2020-12-21 RX ADMIN — PROPOFOL 80 MCG/KG/MIN: 10 INJECTION, EMULSION INTRAVENOUS at 10:32

## 2020-12-21 RX ADMIN — DULOXETINE HYDROCHLORIDE 30 MG: 30 CAPSULE, DELAYED RELEASE ORAL at 20:01

## 2020-12-21 RX ADMIN — PROPOFOL 140 MG: 10 INJECTION, EMULSION INTRAVENOUS at 10:28

## 2020-12-21 RX ADMIN — ACETAMINOPHEN 650 MG: 325 TABLET, FILM COATED ORAL at 21:10

## 2020-12-21 RX ADMIN — OXYCODONE HYDROCHLORIDE 5 MG: 5 TABLET ORAL at 17:51

## 2020-12-21 RX ADMIN — HYDROMORPHONE HYDROCHLORIDE 0.5 MG: 1 INJECTION, SOLUTION INTRAMUSCULAR; INTRAVENOUS; SUBCUTANEOUS at 12:56

## 2020-12-21 RX ADMIN — METOPROLOL SUCCINATE 50 MG: 50 TABLET, EXTENDED RELEASE ORAL at 17:15

## 2020-12-21 RX ADMIN — AMITRIPTYLINE HYDROCHLORIDE 100 MG: 50 TABLET, FILM COATED ORAL at 21:10

## 2020-12-21 RX ADMIN — SUCCINYLCHOLINE CHLORIDE 120 MG: 20 INJECTION, SOLUTION INTRAMUSCULAR; INTRAVENOUS; PARENTERAL at 10:29

## 2020-12-21 RX ADMIN — CYCLOBENZAPRINE HYDROCHLORIDE 10 MG: 10 TABLET, FILM COATED ORAL at 08:51

## 2020-12-21 RX ADMIN — SODIUM CHLORIDE: 9 INJECTION, SOLUTION INTRAVENOUS at 21:13

## 2020-12-21 ASSESSMENT — COGNITIVE AND FUNCTIONAL STATUS - GENERAL
TOILETING: 3 - A LITTLE
DRESSING REGULAR UPPER BODY CLOTHING: 3 - A LITTLE
HELP NEEDED FOR BATHING: 3 - A LITTLE
CLIMB 3 TO 5 STEPS WITH RAILING: 3 - A LITTLE
WALKING IN HOSPITAL ROOM: 3 - A LITTLE
AFFECT: OTHER (SEE COMMENTS)
HELP NEEDED FOR PERSONAL GROOMING: 3 - A LITTLE
STANDING UP FROM CHAIR USING ARMS: 3 - A LITTLE
DRESSING REGULAR LOWER BODY CLOTHING: 3 - A LITTLE
AFFECT: WFL
EATING MEALS: 4 - NONE
MOVING TO AND FROM BED TO CHAIR: 3 - A LITTLE

## 2020-12-21 NOTE — PROGRESS NOTES
Patient: Reji Salvador  Location: [facility] 5PAV 5410  MRN: [id number]  Today's date: 12/21/2020  Pt left seated in bedside chair. NAD. Call bell within reach. Chair alarm activated.  Jean-Paul is a 80 y.o. male admitted on 12/21/2020 with No admission diagnosis documented on admit to order.. Principal problem is No Principal Problem: There is no principal problem currently on the Problem List. Please update the Problem List and refresh..    Past Medical History  Jean-Paul has a past medical history of Bilateral hand pain, Cervical radiculopathy, Coronary atherosclerosis, Fall (09/2020), Fibromyalgia, GERD (gastroesophageal reflux disease), [place] (hard of hearing), radiation therapy, Hypertension, Hypothyroidism, Lipid disorder, Memory impairment, Numbness and tingling in both hands, Osteoarthritis, Prostate cancer (CMS/HCC), Spinal stenosis in cervical region, Transient cerebral ischemia, Tremor, and Type 2 diabetes mellitus (CMS/[facility]).    History of Present Illness   s/p C3-C5 ACDF    PT Vitals    Date/Time Pulse SpO2 Pt Activity O2 Therapy BP [facility]   12/21/20 1650 94 94 % At rest None (Room air) 139/64 LAP      PT Pain    Date/Time Pain Type Pref Pain Scale Location Rating: Rest Rating: Activity Interventions [facility]   12/21/20 1650 Pain Assessment number (Numeric Rating Pain Scale) neck 5 5 position adjusted LAP          Prior Living Environment      Most Recent Value   Living Arrangements  house   Living Environment Comment  lives with daughter, CÉSAR and adult grandchildren          Prior Level of Function      Most Recent Value   Ambulation  independent   Transferring  independent   Toileting  independent   Bathing  independent   Dressing  independent   Eating  independent   Communication  understands/communicates without difficulty  (Pended)    Swallowing  swallows foods/liquids without difficulty  (Pended)    Prior Level of Function Comment  PTA : IND w/ adl's /Mobility w/o device   (Pended)           PT Evaluation  and Treatment - 12/21/20 1650        Time Calculation    Start Time  1650     Stop Time  1705     Time Calculation (min)  15 min        Session Details    Document Type  initial evaluation     Mode of Treatment  physical therapy        General Information    Patient Profile Reviewed?  yes     Existing Precautions/Restrictions  fall;spinal        Cognition/Psychosocial    Affect/Mental Status (Cognitive)  WFL        Sensory Assessment (Somatosensory)    Sensory Assessment (Somatosensory)  LE sensation intact        Range of Motion (ROM)    Range of Motion  ROM is WFL;bilateral lower extremities        Strength (Manual Muscle Testing)    Strength (Manual Muscle Testing)  strength is WFL;bilateral lower extremities        Bed Mobility    [place], Supine to Sit  minimum assist (75% or more patient effort)     Assistive Device (Bed Mobility)  head of bed elevated     Comment (Bed Mobility)  pull to sit. pt reports slight lightheadedness during initial supine to sit. no orthostatic drop noted        Sit to Stand Transfer    [place], Sit to Stand Transfer  supervision     Assistive Device  walker, front-wheeled     Comment  no BP drop noted.         Stand to Sit Transfer    [place], Stand to Sit Transfer  supervision     Assistive Device  walker, front-wheeled        Gait Training    [place], Gait  distant supervision     Assistive Device  walker, front-wheeled     Distance in Feet  180 feet     Gait Pattern Utilized  step-through     Deviations/Abnormal Patterns (Gait)  stride length decreased;step length decreased;gait speed decreased;festinating/shuffling     Comment  poor foot clearance bilaterally. (no DF weakness noted)        Balance    Balance Assessment  standing dynamic balance     Dynamic Standing Balance  mild impairment        AM-PAC (TM) - Mobility (Current Function)    Turning from your back to your side while in a flat bed without using bedrails?  3 - A Little     Moving from lying on  your back to sitting on the side of a flat bed without using bedrails?  3 - A Little     Moving to and from a bed to a chair?  3 - A Little     Standing up from a chair using your arms?  3 - A Little     To walk in a hospital room?  3 - A Little     Climbing 3-5 steps with a railing?  3 - A Little     AM-PAC (TM) Mobility Score  18        Therapy Assessment/Plan (PT)    Rehab Potential (PT)  good, to achieve stated therapy goals     Therapy Frequency (PT)  5-7 times/wk        Progress Summary (PT)    Daily Outcome Statement (PT)  pt ambulating in hallways with RW. NAD.         Therapy Plan Review/Discharge Plan (PT)    PT Recommended Discharge Disposition  home with assist;home with caregiver;home with outpatient services     Anticipated Equipment Needs at Discharge (PT Eval)  other (see comments)    has RW                      Education provided this session. See the Patient Education summary report for full details.    PT Goals      Most Recent Value   Bed Mobility Goal 1   Activity/Assistive Device  bed mobility activities, all at 12/21/2020 1650   [place]  independent at 12/21/2020 1650   Time Frame  by discharge at 12/21/2020 1650   Progress/Outcome  goal ongoing at 12/21/2020 1650   Transfer Goal 1   Activity/Assistive Device  all transfers at 12/21/2020 1650   [place]  independent at 12/21/2020 1650   Time Frame  by discharge at 12/21/2020 1650   Progress/Outcome  goal ongoing at 12/21/2020 1650   Gait Training Goal 1   Activity/Assistive Device  gait (walking locomotion) at 12/21/2020 1650   [place]  modified independence at 12/21/2020 1650   Distance  200 at 12/21/2020 1650   Time Frame  by discharge at 12/21/2020 1650   Progress/Outcome  goal ongoing at 12/21/2020 1650

## 2020-12-21 NOTE — HOSPITAL COURSE
Jean-Paul is a 80 y.o. male admitted on 12/21/2020 with No admission diagnosis documented on admit to order.. Principal problem is No Principal Problem: There is no principal problem currently on the Problem List. Please update the Problem List and refresh..    Past Medical History  Jean-Paul has a past medical history of Bilateral hand pain, Cervical radiculopathy, Coronary atherosclerosis, Fall (09/2020), Fibromyalgia, GERD (gastroesophageal reflux disease), [place] (hard of hearing), radiation therapy, Hypertension, Hypothyroidism, Lipid disorder, Memory impairment, Numbness and tingling in both hands, Osteoarthritis, Prostate cancer (CMS/HCC), Spinal stenosis in cervical region, Transient cerebral ischemia, Tremor, and Type 2 diabetes mellitus (CMS/HCC).    History of Present Illness   80 year old s/p C3-C5 ACDF by Dr Delcid on 12/21  No collar . Needs to wear his eyeglasses  to decr c/o double vision  & B Hearing aides for incr comprehension

## 2020-12-21 NOTE — OR SURGEON
Pre-Procedure patient identification:  I am the primary operating surgeon/proceduralist and I have identified the patient and confirmed laterality is generalized on 12/21/20 at 8:43 AM Compa Delcid MD  Phone Number: [phone] Pre-Procedure patient identification:  I am the primary operating surgeon/proceduralist and I have identified the patient on 12/21/20 at 8:43 AM Compa Delcid MD  Phone Number: [phone]

## 2020-12-21 NOTE — PLAN OF CARE
Problem: Adult Inpatient Plan of Care  Goal: Plan of Care Review  Outcome: Progressing  Flowsheets (Taken 12/21/2020 1642)  Progress: improving  Plan of Care Reviewed With: patient  Outcome Summary: Patient needs MIN w/ head elevated bed mobility , CL SUP w/ supported sitting LE adl's , SUP w/ STS & RW mobility activities 2* decr strength/endurance     Problem: Adult Inpatient Plan of Care  Goal: Patient-Specific Goal (Individualization)  Outcome: Progressing  Flowsheets (Taken 12/21/2020 1642)  Patient-Specific Goals (Include Timeframe): d/c home w/ family     Problem: Functional Ability Impaired (Spinal Surgery)  Goal: Optimal Functional Ability  Outcome: Progressing

## 2020-12-21 NOTE — PLAN OF CARE
Problem: Adult Inpatient Plan of Care  Goal: Plan of Care Review  Outcome: Progressing  Flowsheets (Taken 12/21/2020 9621)  Progress: improving  Plan of Care Reviewed With: patient  Outcome Summary: PT carlos alberto completed

## 2020-12-21 NOTE — PROGRESS NOTES
Orthopaedic Spine Surgery Postoperative Check    [S]  Patient doing well postoperatively, resting comfortably in PACU.    [O]    Vitals:    12/21/20 1337   BP: (!) 150/67   Pulse: 83   Resp: (!) 24   Temp: 36.4 °C (97.5 °F)   SpO2: 97%         Physical Exam    Dressing in place, clean, dry, intact.  Drain: HVx1 with s/s output    RUE  Inspection: No gross deformity. Skin in tact.  Neurovascular: Palpable radial pulse. SILT C4-T1 distribution.  Motor: Deltoid 4+/5, biceps 5/5, triceps 5/5, wrist extensors 5/5, hand intrinsics 4/5    LUE  Inspection: No gross deformity. Skin in tact.  Neurovascular: Palpable radial pulse. SILT C4-T1 distribution.  Motor: Deltoid 4+/5, biceps 5/5, triceps 5/5, wrist extensors 5/5, hand intrinsics 4/5    RLE:  Inspection: No gross deformity. Skin in tact.   Neurovascular: Palpable DP pulse. SILT L2-S1 distibution.  Motor: IP 5/5, Quad 5/5, TA 5/5, EHL 5/5, GS 5/5    LLE:  Inspection: No gross deformity. Skin in tact.   Neurovascular: Palpable DP pulse. SILT L2-S1 distibution.  Motor: IP 5/5, Quad 5/5, TA 5/5, EHL 5/5, GS 5/5    AP: 80 y.o. male POD 0 s/p ACDF C3-5    -- Pain control  -- WBAT  -- DVT: SCDs  -- Advance diet as tolerated  -- Monitor JERSON drain output  -- PT/OT  -- Monitor neurovascular status    Eliza Schmitz MD

## 2020-12-21 NOTE — ANESTHESIA POSTPROCEDURE EVALUATION
Patient: Reji Salvador    Procedure Summary     Date: 12/21/20 Room / Location: [facility] PAV OR  / [facility] OR [facility]    Anesthesia Start: 1023 Anesthesia Stop: 1328    Procedure: Cervical C3-5 Anterior Decompression, Instrumented Fusion, Autograft, Allograft, Neuromonitoring (N/A Spine Cervical) Diagnosis:       Cervical spondylosis with myelopathy      Cervical spinal stenosis      (Spondylosis, Stenosis)    Surgeon: Compa Delcid MD Responsible Provider: Ade Sears MD    Anesthesia Type: general ASA Status: 3          Anesthesia Type: general  PACU Vitals  12/21/2020 1325 - 12/21/2020 1339      12/21/2020  1337             BP:  (!) 150/67    Temp:  36.4 °C (97.5 °F)    Pulse:  83    Resp:  (!) 24    SpO2:  97 %            Anesthesia Post Evaluation    Pain management: adequate  Patient location during evaluation: PACU  Patient participation: complete - patient participated  Level of consciousness: awake and alert  Cardiovascular status: acceptable  Airway Patency: adequate  Respiratory status: acceptable  Hydration status: acceptable  Anesthetic complications: no

## 2020-12-21 NOTE — INTERVAL H&P NOTE
H&P reviewed. The patient was examined and there are no changes to the H&P. Patient with increased blood sugar in PATs. Hgb A1C 8.4. Discussed with patient and anesthesia. Patient is aware of increased risk as a result. However, he has severe spinal cord compression causing myelopathy. We had a discussion regarding postponing surgery vs. Risk of moving forward with surgery. Patient demonstrated understanding of the issues and did elect to move forward.

## 2020-12-21 NOTE — BRIEF OP NOTE
Cervical C3-5 Anterior Decompression, Instrumented Fusion, Autograft, Allograft, Neuromonitoring Procedure Note    Procedure:    Cervical C3-5 Anterior Decompression, Instrumented Fusion, Autograft, Allograft, Neuromonitoring  CPT(R) Code:  00394 - AK ARTHRODESIS ANT INTERBODY INC DISCECTOMY, CERVICAL BELOW C2      Pre-op Diagnosis     * Cervical spondylosis with myelopathy [M47.12]     * Cervical spinal stenosis [M48.02]       Post-op Diagnosis     * Cervical spondylosis with myelopathy [M47.12]     * Cervical spinal stenosis [M48.02]    Surgeon(s) and Role:     * Compa Delcid MD - Primary    Anesthesia: General    Staff:   Circulator: Dyan Jeong, DARIA; Ely Leblanc RN  Physician Assistant: Eva Arboleda PA C  Scrub Person: Delia Dickey RN; Adan Agarwal; Dottie Mendoza RN    Procedure Details   ACDF C3-5    Estimated Blood Loss: No blood loss documented.    Specimens:                Order Name Source Comment Collection Info Order Time   REPEAT ABO/RH (TYPE CHECK) Blood, Venous  Collected By: Mckenna Pratt RN 12/21/2020  8:25 AM     Release to patient   Immediate              Drains:   Drain 1 Anterior Neck 10 Fr. (Active)       Implants:   Implant Name Type Inv. Item Serial No.  Lot No. LRB No. Used Action   CERV SPACER 7° [id number] ATRIX-C - [id number]  CERV SPACER 7° [id number] ATRIX-C  BACTERIN  N/A 1 Implanted   CERV SPACER 7° [id number] ATRIX-C - [id number]  CERV SPACER 7° [id number] ATRIX-C  BACTERIN  N/A 1 Implanted   32mm 2 level plate    ORTHOFIX  N/A 1 Implanted   SCREW 4.0 X 14MM SEMI CONSTRAINED - [id number]  SCREW 4.0 X 14MM SEMI CONSTRAINED  ORTHOFIX  N/A 6 Implanted              Complications:  None; patient tolerated the procedure well.           Disposition: PACU - hemodynamically stable.           Condition: stable    Compa Delcid MD  Phone Number: [phone]

## 2020-12-21 NOTE — PROGRESS NOTES
Patient: Reji Salvador  Location: [facility] 5PAV 5410  MRN: [id number]  Today's date: 12/21/2020     Patient OOB in recliner w/ seat alam activated & call lawrence nearby     Jean-aPul is a 80 y.o. male admitted on 12/21/2020 with No admission diagnosis documented on admit to order.. Principal problem is No Principal Problem: There is no principal problem currently on the Problem List. Please update the Problem List and refresh..    Past Medical History  Jean-Paul has a past medical history of Bilateral hand pain, Cervical radiculopathy, Coronary atherosclerosis, Fall (09/2020), Fibromyalgia, GERD (gastroesophageal reflux disease), [place] (hard of hearing), radiation therapy, Hypertension, Hypothyroidism, Lipid disorder, Memory impairment, Numbness and tingling in both hands, Osteoarthritis, Prostate cancer (CMS/HCC), Spinal stenosis in cervical region, Transient cerebral ischemia, Tremor, and Type 2 diabetes mellitus (CMS/[facility]).    History of Present Illness   s/p C3-C5 ACDF  No collar . Needs to wear his eyeglasses  to decr c/o double vision  & B Hearing aides for incr comprehension     OT Vitals    Date/Time Pulse SpO2 Pt Activity O2 Therapy BP [facility]   12/21/20 1642 94 93 % At rest None (Room air) 139/64 CP   12/21/20 1650 94 94 % At rest None (Room air) 139/64 LAP      OT Pain    Date/Time Pain Type Pref Pain Scale Location Rating: Rest Rating: Activity Rating: Rest Rating: Activity Interventions [facility]   12/21/20 1642 Pain Assessment word (verbal rating pain scale) neck -- -- 2 - mild pain 2 - mild pain position adjusted CP   12/21/20 1650 Pain Assessment number (Numeric Rating Pain Scale) neck 5 5 -- -- position adjusted LAP          Prior Living Environment      Most Recent Value   Living Arrangements  house   Living Environment Comment  lives with daughter, CÉSAR and adult grandchildren          Prior Level of Function      Most Recent Value   Ambulation  independent   Transferring  independent   Toileting  independent    Bathing  independent   Dressing  independent   Eating  independent   Communication  understands/communicates without difficulty   Swallowing  swallows foods/liquids without difficulty   Prior Level of Function Comment  PTA : IND w/ adl's /Mobility w/o device           Occupational Profile      Most Recent Value   Reason for Services/Referral  ADL /ambulation dysfx   Patient Goals   D/c home w/ family           OT Evaluation and Treatment - 12/21/20 1642        Time Calculation    Start Time  1642     Stop Time  1702     Time Calculation (min)  20 min        Session Details    Document Type  initial evaluation     Mode of Treatment  occupational therapy        General Information    Patient Profile Reviewed?  yes     General Observations of Patient  received supine in head elevated bed      Existing Precautions/Restrictions  fall;spinal        Cognition/Psychosocial    Affect/Mental Status (Cognitive)  other (see comments)    mild confusion     Orientation Status (Cognition)  oriented x 3     Follows Commands (Cognition)  follows two step commands     Cognitive Function (Cognitive)  memory deficit        Hearing Assessment    Hearing Status  hearing impairment, bilaterally        Vision Assessment/Intervention    Visual Impairment/Limitations  corrective lenses full time;diplopia        Sensory Assessment (Somatosensory)    Sensory Assessment (Somatosensory)  UE sensation intact        Range of Motion (ROM)    Range of Motion  ROM is WFL;bilateral upper extremities        Strength (Manual Muscle Testing)    Strength (Manual Muscle Testing)  strength is WFL;bilateral upper extremities        Bed Mobility    [place], Supine to Sit  minimum assist (75% or more patient effort)     Assistive Device (Bed Mobility)  bed rails;head of bed elevated        Transfers    Maintains Weight-Bearing Status (Transfers)  able to maintain weight-bearing status     Comment  SUP w/ RW        Bed to Chair Transfer    [place],  Bed to Chair  supervision     Assistive Device  walker, front-wheeled        Sit to Stand Transfer    [place], Sit to Stand Transfer  supervision     Assistive Device  walker, front-wheeled        Stand to Sit Transfer    [place], Stand to Sit Transfer  supervision     Assistive Device  walker, front-wheeled        Balance    Balance Assessment  sitting static balance;sitting dynamic balance;sit to stand dynamic balance;standing static balance     Static Sitting Balance  WFL     Dynamic Sitting Balance  WFL     Sit to Stand Dynamic Balance  mild impairment     Static Standing Balance  WFL     Dynamic Standing Balance  mild impairment     Comment, Balance  Ambulates better w/ eyeglasses to decr c/o doulble vision         Lower Body Dressing    [place] Assistance  dons/doffs left sock;dons/doffs right sock     [place]  close supervision     Position  supported sitting        AM-PAC (TM) - ADL (Current Function)    Putting on and taking off regular lower body clothing?  3 - A Little     Bathing?  3 - A Little     Toileting?  3 - A Little     Putting on/taking off regular upper body clothing?  3 - A Little     How much help for taking care of personal grooming?  3 - A Little     Eating meals?  4 - None     AM-PAC (TM) ADL Score  19        Therapy Assessment/Plan (OT)    Rehab Potential (OT)  good, to achieve stated therapy goals     Therapy Frequency (OT)  3-5 times/wk     Criteria for Skilled Therapeutic Interventions Met (OT)  skilled treatment is necessary     Problem List (OT)  strength     OT Diagnosis  ACDF C3-5 / WBAT      Patient/Family Therapy Goal Statement (OT)  D/c home w/ family         Progress Summary (OT)    Daily Outcome Statement (OT)  [facility] 19 : patient needs MIN w/ head elevated bed mobility , CL SUP w/ LE adl's & SUP w/ STS & Rw mobility activities . His eyeglasses are a MUST to decr c/o doulble  vision        Therapy Plan Review/Discharge Plan (OT)    OT Recommended Discharge  Disposition  home with assist     Anticipated Equipment Needs At Discharge (OT)  walker, front-wheeled                   Education provided this session. See the Patient Education summary report for full details.         OT Goals      Most Recent Value   Bed Mobility Goal 1   Activity/Assistive Device  sit to supine, supine to sit at 12/21/2020 1642   [place]  modified independence at 12/21/2020 1642   Time Frame  by discharge at 12/21/2020 1642   Progress/Outcome  goal ongoing at 12/21/2020 1642   Transfer Goal 1   Activity/Assistive Device  sit-to-stand/stand-to-sit, bed-to-chair/chair-to-bed, walker, front-wheeled at 12/21/2020 1642   [place]  modified independence at 12/21/2020 1642   Time Frame  by discharge at 12/21/2020 1642   Progress/Outcome  goal partially met at 12/21/2020 1642   Dressing Goal 1   Activity/Adaptive Equipment  lower body dressing at 12/21/2020 1642   [place]  modified independence at 12/21/2020 1642   Time Frame  by discharge at 12/21/2020 1642   Progress/Outcome  goal partially met at 12/21/2020 1642

## 2020-12-21 NOTE — CONSULTS
Hospital Medicine Service -  Daily Progress Note       SUBJECTIVE   Interval History: pt seen and examined. Resting in bed. No chest pain/dyspnea     OBJECTIVE      Vital signs in last 24 hours:  Temp:  [36.2 °C (97.1 °F)-36.7 °C (98 °F)] 36.7 °C (98 °F)  Heart Rate:  [68-84] 84  Resp:  [16-25] 16  BP: (122-219)/() 136/67    Intake/Output Summary (Last 24 hours) at 12/21/2020 1619  Last data filed at 12/21/2020 1520  Gross per 24 hour   Intake 1460 ml   Output 600 ml   Net 860 ml       PHYSICAL EXAMINATION      Physical Exam  Constitutional:       Appearance: He is not toxic-appearing or diaphoretic.   Eyes:      General: No scleral icterus.  Cardiovascular:      Rate and Rhythm: Regular rhythm.      Heart sounds: No friction rub. No gallop.    Pulmonary:      Effort: Pulmonary effort is normal. No respiratory distress.      Breath sounds: Normal breath sounds. No wheezing or rales.   Abdominal:      General: Bowel sounds are normal. There is no distension.      Palpations: Abdomen is soft.      Tenderness: There is no abdominal tenderness. There is no guarding.   Neurological:      Mental Status: He is alert.      Comments: Alert/awake. No slurred speech        LINES, CATHETERS, DRAINS, AIRWAYS, AND WOUNDS   Lines, Drains, Airways, Wounds:  Peripheral IV 12/21/20 Anterior;Right Wrist (Active)   Number of days: 0       Peripheral IV 12/21/20 Left Hand (Active)   Number of days: 0       Drain 1 Anterior Neck 10 Fr. (Active)   Number of days: 0       Surgical Incision Neck (Active)   Number of days: 0       Comments:      LABS / IMAGING / TELE      Labs  Results from last 7 days   Lab Units 12/16/20  1131   WBC K/uL 8.19   HEMOGLOBIN g/dL 14.0   HEMATOCRIT % 43.4   PLATELETS K/uL 216     Results from last 7 days   Lab Units 12/16/20  1131   SODIUM mEQ/L 136   POTASSIUM mEQ/L 4.7   CHLORIDE mEQ/L 100   CO2 mEQ/L 29   BUN mg/dL 27*   CREATININE mg/dL 1.1   CALCIUM mg/dL 9.5   GLUCOSE mg/dL 416*        IMAGING  X-ray Cervical Spine 2 Or 3 Views    Result Date: 12/21/2020  IMPRESSION: Anterior fusion C3-C5. COMMENT: Two images obtained intraoperatively demonstrate anterior fusion from C3 through C5. REFERENCE AIR KERMA: 1.06 mGy.    Fl Fluoroscopy Technical Assistance    Result Date: 12/21/2020  IMPRESSION: Anterior fusion C3-C5. COMMENT: Two images obtained intraoperatively demonstrate anterior fusion from C3 through C5. REFERENCE AIR KERMA: 1.06 mGy.      ASSESSMENT AND PLAN      Status post cervical spinal fusion  Assessment & Plan  s/p ACDF C3-5  Cont w pain management. rec PT/OT eval and treat. Further management per ortho surgery team. rec DVT proph per ortho rec.      Hyperglycemia  Assessment & Plan  Monitor accu checks w SSI  Cont w amaryl.     Hyperlipidemia  Assessment & Plan  Cont w statin.     HTN (hypertension)  Assessment & Plan  Cont w ace-I/bb and monitor.     Memory disorder  Assessment & Plan  Cont w supportive care.   Cont w namenda.          VTE Prophylaxis Plan: Pharmacological DVT prophylaxis and timing of such per the discretion of the surgeon. Strongly recommend maintain sequential compression device  Code Status: Full Code  Estimated Discharge Date: 12/22/2020     Joselo Aranda,   12/21/2020

## 2020-12-21 NOTE — ANESTHESIA PREPROCEDURE EVALUATION
Relevant Problems   CARDIOVASCULAR   (+) HTN (hypertension)      NEUROLOGY   (+) History of CVA (cerebrovascular accident)      Other   (+) DM (diabetes mellitus) (CMS/HCC)       Anesthesia ROS/MED HX    Anesthesia History    Previous anesthetics  No history of anesthetic complications  Pulmonary - neg  Neuro/Psych    TIA  Cardiovascular  No MCKINLEY   CAD   dyslipidemia   hypertension   Covid19 Test Reviewed and ECG reviewed  Hematological    anticoagulants  GI/Hepatic   GERD  Musculoskeletal   Osteoarthritis  Renal Disease- neg  Endo/Other   Diabetes (poc 197 12/21/2020)   Hypothyroidism  History of cancer and prostate cancer  Body Habitus: Obese  ROS/MED HX Comments:    Cardiology: exc ede > 4mets   GI/Hepatic/Renal: Julio npo per pt   Musculoskeletal:  Cervical spondylosis with myelopathy (M47.12)       Cervical spinal stenosis       Past Surgical History:   Procedure Laterality Date   • ANKLE FRACTURE SURGERY Left 09/2020   • CATARACT EXTRACTION W/  INTRAOCULAR LENS IMPLANT Bilateral    • COLONOSCOPY     • TONSILLECTOMY         Physical Exam    Airway   Mallampati: II   TM distance: >3 FB   Neck ROM: full  Cardiovascular - normal   Rhythm: regular   Rate: normalPulmonary - normal   clear to auscultation  Dental - normal        Anesthesia Plan    Plan: general    Technique: general endotracheal     Lines and Monitors: PIV and additional IV     Airway: video laryngoscope and oral intubation   ASA 3  Anesthetic plan and risks discussed with: patient  Induction:    intravenous   Postop Plan:   Patient Disposition: phase II then home   Pain Management: IV analgesics

## 2020-12-21 NOTE — ASSESSMENT & PLAN NOTE
s/p ACDF C3-5  Cont w pain management. rec PT/OT eval and treat. Further management per ortho surgery team + monitor. rec DVT proph per ortho rec.

## 2020-12-21 NOTE — OP NOTE
Attending Surgeon: Compa Delcid MD    Assistant: Eva Arboleda    Preoperative diagnosis: C3-5 Stenosis, Myelopathy    Postoperative Diagnosis: Same    Procedure: C3-5 ACDF    Anesthesia: GET    EBL: 20 cc    Instrumentation: Orthofix    Status: Stable to PACU    INDICATIONS:  Reji Savlador is a pleasant 80 y.o. male who presented to my office complaining of neck and bilateral upper extremity numbness and tingling and difficulty with ambulation and fine motor skills. Symptoms were progressive and were significantly affecting the patient's activity level and quality of life. Symptoms were recalcitrant to nonsurgical management. I examined the patient and reviewed the imaging studies. Imaging was notable for C3-5 Stenosis. I discussed my findings with the patient. We discussed both surgical and nonsurgical treatment options. The patient was aware that nonsurgical options were available but did risk persistence and potential progression of symptoms. Surgical intervention was also discussed. This would involve a C3-5 ACDF. The procedure was discussed in detail. The risks were also discussed. The patient demonstrated good understanding of the risks, benefits and alternatives and elected undergo surgery and signed the appropriate consent forms.    TECHNIQUE:  The patient was identified in the preoperative holding area. The surgical site was marked in the procedure was confirmed. The patient was taken to the operating room and transferred to the OR table. Bilateral SCDs were applied. General anesthesia was induced. Endotracheal intubation was performed. Neuromonitoring leads were placed and baseline signals were obtained. The patient was positioned in usual fashion. The patient was prepped and draped in the usual sterile fashion. A surgical timeout was performed. Perioperative antibiotics were administered.    A transverse skin incision was made. The plane between the SCM and the strap muscles followed by the carotid  sheath and the trachea was developed down to the anterior aspect of the spine. A localizing film was obtained. After confirming the level the exposure was completed. Self retaining retractors were placed and anchored under the longus colli muscles. [place] pins were used for distraction. A complete discectomy was performed. The endplates were denuded of cartilage. The posterior osteophytes were removed. The PLL was taken down across the disc space and the bilateral neuroforamen was decompressed. Upon completion of the decompression there was no central stenosis, the bilateral C4 & 5 nerve roots exited the spine without compression. A rasp was used to prepare the endplates and size the disc space. A lordotic, corticocancellous allograft was placed in the disc space. Autograft was placed lateral to the allograft. The rachel pins were removed. The anterior vertebral bodies were leveled. An anterior plate was secured to the bone with two screws in C3-5. The locking mechanism was secured. Final AP and Lateral xrays were taken to confirm positioning. The wound was copiously irrigated. Hemostasis was achieved. A deep drain was placed. The wound were closed in multiple layers and sterile dressings were placed.    General anesthasia was discontinued and extubation was performed. The patient woke up grossly moving the lower extremities bilaterally. The patient was then taken to the recovery room in stable condition.    At the completion the case needle and sponge accounts were correct x2.    Periodically throughout the case MEPs and SSEPs were monitored and remained at baseline.    My PA, Eva Arboleda, assisted with patient positioning, prepping, draping, tissue retraction, suctioning and wound closer. There was no qualified resident or fellow who had completed their spine rotation available to assist on the case.    I was present and participated in all key aspects of the surgical procedure.

## 2020-12-21 NOTE — ANESTHESIA PROCEDURE NOTES
Airway  Urgency: elective    Start Time: 12/21/2020 10:30 AM  Airway not difficult    General Information and Staff    Patient location during procedure: OR  Anesthesiologist: Ade Sears MD  Performed: anesthesiologist     Indications and Patient Condition  Indications for airway management: anesthesia  Sedation level: deep  Preoxygenated: yes  Patient position: sniffing  MILS maintained throughout  Mask difficulty assessment: 2 - vent by mask + OA or adjuvant +/- NMBA    Final Airway Details  Final airway type: endotracheal airway      Successful airway: ETT  Cuffed: yes   Successful intubation technique: video laryngoscopy  Facilitating devices/methods: intubating stylet  Endotracheal tube insertion site: oral  Blade: Emily  Blade size: #4  ETT size (mm): 8.0  Cormack-Lehane Classification: grade IIa - partial view of glottis  Placement verified by: chest auscultation and capnometry   Measured from: lips  ETT to lips (cm): 24  Number of attempts at approach: 1  Ventilation between attempts: none  Number of other approaches attempted: 0  Atraumatic airway insertion

## 2020-12-21 NOTE — ANESTHESIOLOGIST PRE-PROCEDURE ATTESTATION
Pre-Procedure Patient Identification:  I am the Primary Anesthesiologist and have identified the patient on 12/21/20 at 9:30 AM.   I have confirmed the following procedure(s) Cervical C3-5 Anterior Decompression, Instrumented Fusion, Autograft, Allograft, Neuromonitoring will be performed by the following surgeon/proceduralist Compa Delcid MD.

## 2020-12-22 VITALS
HEART RATE: 83 BPM | DIASTOLIC BLOOD PRESSURE: 57 MMHG | HEIGHT: 66 IN | BODY MASS INDEX: 30.53 KG/M2 | TEMPERATURE: 98.3 F | OXYGEN SATURATION: 94 % | SYSTOLIC BLOOD PRESSURE: 117 MMHG | WEIGHT: 190 LBS | RESPIRATION RATE: 18 BRPM

## 2020-12-22 PROBLEM — R41.82 ALTERED MENTAL STATUS: Status: ACTIVE | Noted: 2020-12-22

## 2020-12-22 LAB
ANION GAP SERPL CALC-SCNC: 10 MEQ/L (ref 3–15)
ATRIAL RATE: 101
BUN SERPL-MCNC: 16 MG/DL (ref 8–20)
CALCIUM SERPL-MCNC: 7.9 MG/DL (ref 8.9–10.3)
CHLORIDE SERPL-SCNC: 101 MEQ/L (ref 98–109)
CO2 SERPL-SCNC: 24 MEQ/L (ref 22–32)
CREAT SERPL-MCNC: 0.9 MG/DL (ref 0.8–1.3)
ERYTHROCYTE [DISTWIDTH] IN BLOOD BY AUTOMATED COUNT: 13.1 % (ref 11.6–14.4)
GFR SERPL CREATININE-BSD FRML MDRD: >60 ML/MIN/1.73M*2
GLUCOSE BLD-MCNC: 177 MG/DL (ref 70–99)
GLUCOSE BLD-MCNC: 195 MG/DL (ref 70–99)
GLUCOSE BLD-MCNC: 213 MG/DL (ref 70–99)
GLUCOSE SERPL-MCNC: 198 MG/DL (ref 70–99)
HCT VFR BLDCO AUTO: 40.3 % (ref 40.1–51)
HGB BLD-MCNC: 12.9 G/DL (ref 13.7–17.5)
MCH RBC QN AUTO: 30.3 PG (ref 28–33.2)
MCHC RBC AUTO-ENTMCNC: 32 G/DL (ref 32.2–36.5)
MCV RBC AUTO: 94.6 FL (ref 83–98)
P AXIS: 49
PDW BLD AUTO: 9.9 FL (ref 9.4–12.4)
PLATELET # BLD AUTO: 190 K/UL (ref 150–350)
POCT TEST: ABNORMAL
POTASSIUM SERPL-SCNC: 3.7 MEQ/L (ref 3.6–5.1)
PR INTERVAL: 214
QRS DURATION: 120
QT INTERVAL: 356
QTC CALCULATION(BAZETT): 461
R AXIS: -57
RBC # BLD AUTO: 4.26 M/UL (ref 4.5–5.8)
SODIUM SERPL-SCNC: 135 MEQ/L (ref 136–144)
T WAVE AXIS: 38
VENTRICULAR RATE: 101
WBC # BLD AUTO: 9.36 K/UL (ref 3.8–10.5)

## 2020-12-22 PROCEDURE — 97116 GAIT TRAINING THERAPY: CPT | Mod: GP

## 2020-12-22 PROCEDURE — 36415 COLL VENOUS BLD VENIPUNCTURE: CPT | Performed by: NURSE PRACTITIONER

## 2020-12-22 PROCEDURE — 63600000 HC DRUGS/DETAIL CODE: Performed by: NURSE PRACTITIONER

## 2020-12-22 PROCEDURE — 200200 PR NO CHARGE: Performed by: HOSPITALIST

## 2020-12-22 PROCEDURE — 25800000 HC PHARMACY IV SOLUTIONS: Performed by: NURSE PRACTITIONER

## 2020-12-22 PROCEDURE — 63600000 HC DRUGS/DETAIL CODE: Performed by: HOSPITALIST

## 2020-12-22 PROCEDURE — 99291 CRITICAL CARE FIRST HOUR: CPT | Performed by: HOSPITALIST

## 2020-12-22 PROCEDURE — 97530 THERAPEUTIC ACTIVITIES: CPT | Mod: GP,59

## 2020-12-22 PROCEDURE — 93005 ELECTROCARDIOGRAM TRACING: CPT | Performed by: ORTHOPAEDIC SURGERY

## 2020-12-22 PROCEDURE — 80048 BASIC METABOLIC PNL TOTAL CA: CPT | Performed by: NURSE PRACTITIONER

## 2020-12-22 PROCEDURE — 63700000 HC SELF-ADMINISTRABLE DRUG: Performed by: NURSE PRACTITIONER

## 2020-12-22 PROCEDURE — 85027 COMPLETE CBC AUTOMATED: CPT | Performed by: NURSE PRACTITIONER

## 2020-12-22 PROCEDURE — 97535 SELF CARE MNGMENT TRAINING: CPT | Mod: GO

## 2020-12-22 PROCEDURE — 25000000 HC PHARMACY GENERAL: Performed by: HOSPITALIST

## 2020-12-22 RX ORDER — BACLOFEN 10 MG/1
10 TABLET ORAL 3 TIMES DAILY
Qty: 90 TABLET | Refills: 0
Start: 2020-12-22 | End: 2021-01-21

## 2020-12-22 RX ORDER — CHOLECALCIFEROL (VITAMIN D3) 50 MCG
CAPSULE ORAL
Start: 2020-12-22

## 2020-12-22 RX ORDER — HYDROCODONE BITARTRATE AND ACETAMINOPHEN 5; 325 MG/1; MG/1
1-2 TABLET ORAL EVERY 4 HOURS PRN
Start: 2020-12-22 | End: 2020-12-27

## 2020-12-22 RX ORDER — ASPIRIN 81 MG/1
81 TABLET ORAL DAILY
Refills: 0
Start: 2020-12-24

## 2020-12-22 RX ORDER — METHYLPREDNISOLONE 4 MG/1
4 TABLET ORAL SEE ADMIN INSTRUCTIONS
Qty: 21 TABLET | Refills: 0 | Status: SHIPPED | OUTPATIENT
Start: 2020-12-22 | End: 2020-12-29

## 2020-12-22 RX ORDER — NALOXONE HYDROCHLORIDE 0.4 MG/ML
INJECTION, SOLUTION INTRAMUSCULAR; INTRAVENOUS; SUBCUTANEOUS
Status: DISCONTINUED
Start: 2020-12-22 | End: 2020-12-22 | Stop reason: HOSPADM

## 2020-12-22 RX ORDER — ALBUTEROL SULFATE 0.83 MG/ML
2.5 SOLUTION RESPIRATORY (INHALATION) EVERY 6 HOURS PRN
Status: DISCONTINUED | OUTPATIENT
Start: 2020-12-22 | End: 2020-12-22 | Stop reason: HOSPADM

## 2020-12-22 RX ORDER — CLOPIDOGREL BISULFATE 75 MG/1
75 TABLET ORAL DAILY
Refills: 0
Start: 2020-12-24

## 2020-12-22 RX ORDER — DOCUSATE SODIUM 100 MG/1
100 CAPSULE, LIQUID FILLED ORAL 2 TIMES DAILY PRN
Start: 2020-12-22 | End: 2021-01-21

## 2020-12-22 RX ORDER — NALOXONE HYDROCHLORIDE 0.4 MG/ML
0.1 INJECTION, SOLUTION INTRAMUSCULAR; INTRAVENOUS; SUBCUTANEOUS ONCE
Status: COMPLETED | OUTPATIENT
Start: 2020-12-22 | End: 2020-12-22

## 2020-12-22 RX ADMIN — MEMANTINE 5 MG: 10 TABLET ORAL at 09:02

## 2020-12-22 RX ADMIN — NALOXONE HYDROCHLORIDE 0.1 MG: 0.4 INJECTION, SOLUTION INTRAMUSCULAR; INTRAVENOUS; SUBCUTANEOUS at 00:39

## 2020-12-22 RX ADMIN — LISINOPRIL 20 MG: 20 TABLET ORAL at 09:03

## 2020-12-22 RX ADMIN — ACETAMINOPHEN 650 MG: 325 TABLET, FILM COATED ORAL at 03:12

## 2020-12-22 RX ADMIN — BENZOCAINE AND MENTHOL 1 LOZENGE: 15; 3.6 LOZENGE ORAL at 09:11

## 2020-12-22 RX ADMIN — INSULIN ASPART 2 UNITS: 100 INJECTION, SOLUTION INTRAVENOUS; SUBCUTANEOUS at 10:50

## 2020-12-22 RX ADMIN — ACETAMINOPHEN 650 MG: 325 TABLET, FILM COATED ORAL at 09:04

## 2020-12-22 RX ADMIN — GLIMEPIRIDE 4 MG: 4 TABLET ORAL at 09:03

## 2020-12-22 RX ADMIN — SENNOSIDES, DOCUSATE SODIUM 1 TABLET: 8.6; 5 TABLET ORAL at 09:02

## 2020-12-22 RX ADMIN — PANTOPRAZOLE SODIUM 20 MG: 20 TABLET, DELAYED RELEASE ORAL at 09:03

## 2020-12-22 RX ADMIN — DULOXETINE HYDROCHLORIDE 30 MG: 30 CAPSULE, DELAYED RELEASE ORAL at 09:04

## 2020-12-22 RX ADMIN — CEFAZOLIN 2 G: 330 INJECTION, POWDER, FOR SOLUTION INTRAMUSCULAR; INTRAVENOUS at 00:47

## 2020-12-22 RX ADMIN — EZETIMIBE 10 MG: 10 TABLET ORAL at 09:03

## 2020-12-22 RX ADMIN — POLYETHYLENE GLYCOL 3350 17 G: 17 POWDER, FOR SOLUTION ORAL at 09:02

## 2020-12-22 RX ADMIN — SODIUM CHLORIDE: 9 INJECTION, SOLUTION INTRAVENOUS at 10:08

## 2020-12-22 RX ADMIN — GABAPENTIN 400 MG: 400 CAPSULE ORAL at 14:07

## 2020-12-22 RX ADMIN — ALBUTEROL SULFATE 2.5 MG: 2.5 SOLUTION RESPIRATORY (INHALATION) at 01:37

## 2020-12-22 RX ADMIN — GABAPENTIN 400 MG: 400 CAPSULE ORAL at 09:03

## 2020-12-22 ASSESSMENT — COGNITIVE AND FUNCTIONAL STATUS - GENERAL
HELP NEEDED FOR BATHING: 3 - A LITTLE
MOVING TO AND FROM BED TO CHAIR: 4 - NONE
AFFECT: WFL
CLIMB 3 TO 5 STEPS WITH RAILING: 4 - NONE
DRESSING REGULAR LOWER BODY CLOTHING: 3 - A LITTLE
TOILETING: 3 - A LITTLE
WALKING IN HOSPITAL ROOM: 4 - NONE
AFFECT: WFL
HELP NEEDED FOR PERSONAL GROOMING: 3 - A LITTLE
DRESSING REGULAR UPPER BODY CLOTHING: 3 - A LITTLE
EATING MEALS: 4 - NONE
STANDING UP FROM CHAIR USING ARMS: 4 - NONE

## 2020-12-22 NOTE — PROGRESS NOTES
Orthopaedic Spine Surgery Progress Note    [S]  Voided x once but unable to void overnight so SCx1 for 1L. DTV 1000. Drain auto-dced. Pain is controlled. - flatus. No SOB, satin 97%. Worked with PT yesterday.      [O]    Vitals:    12/22/20 0542   BP:    Pulse:    Resp:    Temp:    SpO2: 97%         Physical Exam    Dressing in place, clean, dry, intact.  Drain: none    RUE  Inspection: No gross deformity. Skin in tact.  Neurovascular: Palpable radial pulse. SILT C4-T1 distribution.  Motor: Deltoid 4+/5, biceps 5/5, triceps 5/5, wrist extensors 5/5, hand intrinsics 4/5    LUE  Inspection: No gross deformity. Skin in tact.  Neurovascular: Palpable radial pulse. SILT C4-T1 distribution.  Motor: Deltoid 4+/5, biceps 5/5, triceps 5/5, wrist extensors 5/5, hand intrinsics 4/5    RLE:  Inspection: No gross deformity. Skin in tact.   Neurovascular: Palpable DP pulse. SILT L2-S1 distibution.  Motor: IP 5/5, Quad 5/5, TA 5/5, EHL 5/5, GS 5/5    LLE:  Inspection: No gross deformity. Skin in tact.   Neurovascular: Palpable DP pulse. SILT L2-S1 distibution.  Motor: IP 5/5, Quad 5/5, TA 5/5, EHL 5/5, GS 5/5    AP: 80 y.o. male POD 0 s/p ACDF C3-5    -- Pain control  -- WBAT  -- DVT: SCDs  -- Advance diet as tolerated  -- Monitor JERSON drain output  -- PT/OT  -- Monitor neurovascular status    Yaakov Jon MD

## 2020-12-22 NOTE — ASSESSMENT & PLAN NOTE
Overnight events noted and I d/w dr. ewing from [facility] this morning  Pt's mental status back to baseline now  No neuro changes from baseline. AAO X3. Pt reports feeling good overall.   AMS most likely related to narcotics. Recommend cautious use of narcotics.

## 2020-12-22 NOTE — PATIENT CARE CONFERENCE
Care Progression Rounds Note  Date: 12/22/2020  Time: 10:03 AM     Patient Name: Reji Salvador     Medical Record Number: [id number]   YOB: 1940  Sex: Male      Room/Bed: 5410    Admitting Diagnosis: Cervical spondylosis with myelopathy [M47.12]  Cervical spinal stenosis [M48.02]  Status post cervical spinal fusion [Z98.1]   Admit Date/Time: 12/21/2020  7:47 AM    Primary Diagnosis: No Principal Problem: There is no principal problem currently on the Problem List. Please update the Problem List and refresh.  Principal Problem: No Principal Problem: There is no principal problem currently on the Problem List. Please update the Problem List and refresh.    GMLOS: pending  Anticipated Discharge Date: 12/22/2020    AM-PAC  Mobility Score: 24    Discharge Planning:  Living Arrangements: house    Barriers to Discharge:  Barriers to Discharge: Medical issues not resolved    Participants:  advanced practice provider, , nursing, physical therapy, social work/services

## 2020-12-22 NOTE — PROGRESS NOTES
Patient: Reji Salvador  Location: [facility] 5PAV 5410  MRN: [id number]  Today's date: 12/22/2020     Patient returned to seated position in recliner, NAD  Chair alarm activated  All personal needs within reach  Nsg notified of pt's performance       Jean-Paul is a 80 y.o. male admitted on 12/21/2020 with No admission diagnosis documented on admit to order.. Principal problem is No Principal Problem: There is no principal problem currently on the Problem List. Please update the Problem List and refresh..    Past Medical History  Jean-Paul has a past medical history of Bilateral hand pain, Cervical radiculopathy, Coronary atherosclerosis, Fall (09/2020), Fibromyalgia, GERD (gastroesophageal reflux disease), [place] (hard of hearing), radiation therapy, Hypertension, Hypothyroidism, Lipid disorder, Memory impairment, Numbness and tingling in both hands, Osteoarthritis, Prostate cancer (CMS/HCC), Spinal stenosis in cervical region, Transient cerebral ischemia, Tremor, and Type 2 diabetes mellitus (CMS/HCC).    History of Present Illness   80 year old s/p C3-C5 ACDF by Dr Delcid on 12/21  No collar . Needs to wear his eyeglasses  to decr c/o double vision  & B Hearing aides for incr comprehension     PT Vitals    Date/Time Pulse SpO2 Pt Activity O2 Therapy BP BP Method Pt Position [facility]   12/22/20 0701 82 94 % At rest None (Room air) 118/58 Automatic Lying LA      PT Pain    Date/Time Pain Type Pref Pain Scale Location Rating: Rest Rating: Activity Interventions [facility]   12/22/20 0701 Pain Assessment number (Numeric Rating Pain Scale) neck 2 2 position adjusted LA          Prior Living Environment      Most Recent Value   Living Arrangements  house   Living Environment Comment  lives with daughter, CÉSAR and adult grandchildren          Prior Level of Function      Most Recent Value   Ambulation  independent   Transferring  independent   Toileting  independent   Bathing  independent   Dressing  independent   Eating  independent    Communication  understands/communicates without difficulty   Swallowing  swallows foods/liquids without difficulty   Prior Level of Function Comment  PTA : IND w/ adl's /Mobility w/o device           PT Evaluation and Treatment - 12/22/20 0701        Time Calculation    Start Time  0701     Stop Time  0729     Time Calculation (min)  28 min        Session Details    Document Type  daily treatment/progress note     Mode of Treatment  physical therapy        General Information    Patient Profile Reviewed?  yes     General Observations of Patient  Pt received supine in bed     Existing Precautions/Restrictions  fall;spinal        Cognition/Psychosocial    Affect/Mental Status (Cognitive)  WFL     Orientation Status (Cognition)  oriented x 3     Follows Commands (Cognition)  WFL     Cognitive Function (Cognitive)  memory deficit     Memory Deficit (Cognitive)  minimal deficit        Bed Mobility    Bed Mobility  supine to sit to supine     [place], Supine to Sit  independent     Assistive Device (Bed Mobility)  none     Comment (Bed Mobility)  flat bed        Transfers    Transfers  car transfer     Maintains Weight-Bearing Status (Transfers)  able to maintain weight-bearing status        Sit to Stand Transfer    [place], Sit to Stand Transfer  modified independence     Assistive Device  walker, front-wheeled     Comment  no reports of dizziness, no assist needed        Stand to Sit Transfer    [place], Stand to Sit Transfer  modified independence     Assistive Device  walker, front-wheeled        Car Transfer    Transfer Technique  sit-stand;stand-sit     [place], Car Transfer  modified independence     Assistive Device  walker, front-wheeled     Comment  no difficulty performing tx, good technique        Gait Training    [place], Gait  modified independence     Assistive Device  walker, front-wheeled     Distance in Feet  250 feet     Gait Pattern Utilized  step-through      Deviations/Abnormal Patterns (Gait)  alejandro decreased     Comment  no LOB, good overall foot clearance, trunk forward posture        Stairs Training    [place], Stairs  other (see comments)     Comment  pt reports first floor set up, no MARIO        Safety Issues, Functional Mobility    Impairments Affecting Function (Mobility)  pain;endurance/activity tolerance        Balance    Balance Assessment  sitting static balance;sit to stand dynamic balance;standing static balance;standing dynamic balance     Static Sitting Balance  WFL     Sit to Stand Dynamic Balance  WFL     Static Standing Balance  WFL     Dynamic Standing Balance  WFL     Comment, Balance  no LOB        AM-PAC (TM) - Mobility (Current Function)    Turning from your back to your side while in a flat bed without using bedrails?  4 - None     Moving from lying on your back to sitting on the side of a flat bed without using bedrails?  4 - None     Moving to and from a bed to a chair?  4 - None     Standing up from a chair using your arms?  4 - None     To walk in a hospital room?  4 - None     Climbing 3-5 steps with a railing?  4 - None     AM-PAC (TM) Mobility Score  24        Progress Summary (PT)    Daily Outcome Statement (PT)  12/22 Pt seen for follow up PT session; Patient able to recall spinal precautions and maintains throughout session; demonstrate good safety awareness and complete all functional tasks without assist; patient completed therapy goals, no further skilled PT needs identified     Symptoms Noted During/After Treatment  none        Therapy Plan Review/Discharge Plan (PT)    PT Recommended Discharge Disposition  home with assist     Anticipated Equipment Needs at Discharge (PT Eval)  none        Plan of Care Review    Plan of Care Reviewed With  patient                       Education provided this session. See the Patient Education summary report for full details.    PT Goals      Most Recent Value   Bed Mobility Goal 1    Activity/Assistive Device  bed mobility activities, all at 12/21/2020 1650   [place]  independent at 12/21/2020 1650   Time Frame  by discharge at 12/21/2020 1650   Progress/Outcome  goal ongoing at 12/21/2020 1650   Transfer Goal 1   Activity/Assistive Device  all transfers at 12/21/2020 1650   [place]  independent at 12/21/2020 1650   Time Frame  by discharge at 12/21/2020 1650   Progress/Outcome  goal partially met at 12/22/2020 0701   Gait Training Goal 1   Activity/Assistive Device  gait (walking locomotion) at 12/21/2020 1650   [place]  modified independence at 12/21/2020 1650   Distance  200 at 12/21/2020 1650   Time Frame  by discharge at 12/21/2020 1650   Progress/Outcome  goal met at 12/22/2020 0701

## 2020-12-22 NOTE — PROGRESS NOTES
Patient: Reji Salvador  Location: [facility] 5PAV 5410  MRN: [id number]  Today's date: 12/22/2020     Patient sitting in recliner w/ seat alarm activated & call bell nearby          Jean-Paul is a 80 y.o. male admitted on 12/21/2020 with No admission diagnosis documented on admit to order.. Principal problem is No Principal Problem: There is no principal problem currently on the Problem List. Please update the Problem List and refresh..    Past Medical History  Jean-Paul has a past medical history of Bilateral hand pain, Cervical radiculopathy, Coronary atherosclerosis, Fall (09/2020), Fibromyalgia, GERD (gastroesophageal reflux disease), [place] (hard of hearing), radiation therapy, Hypertension, Hypothyroidism, Lipid disorder, Memory impairment, Numbness and tingling in both hands, Osteoarthritis, Prostate cancer (CMS/HCC), Spinal stenosis in cervical region, Transient cerebral ischemia, Tremor, and Type 2 diabetes mellitus (CMS/HCC).    History of Present Illness   80 year old s/p C3-C5 ACDF by Dr Delcid on 12/21  No collar . Needs to wear his eyeglasses  to decr c/o double vision  & B Hearing aides for incr comprehension     OT Vitals    Date/Time SpO2 Pt Activity O2 Therapy [facility]   12/22/20 1034 96 % At rest Supplemental oxygen CP      OT Pain    Date/Time Pain Type Pref Pain Scale Location Rating: Rest Rating: Activity [facility]   12/22/20 1034 Pain Assessment word (verbal rating pain scale) neck 2 - mild pain 2 - mild pain CP          Prior Living Environment      Most Recent Value   Living Arrangements  house   Living Environment Comment  lives with daughter, CÉSAR and adult grandchildren          Prior Level of Function      Most Recent Value   Ambulation  independent   Transferring  independent   Toileting  independent   Bathing  independent   Dressing  independent   Eating  independent   Communication  understands/communicates without difficulty   Swallowing  swallows foods/liquids without difficulty   Prior Level of  Function Comment  PTA : IND w/ adl's /Mobility w/o device    Assistive Device/Animal Currently Used at Home  other (see comments) [pt has cane and RW from broken ankle 5 MOS ago but no longer needs them]          Occupational Profile      Most Recent Value   Reason for Services/Referral  ADL /ambulation dysfx   Patient Goals  to get stronger           OT Evaluation and Treatment - 12/22/20 1034        Time Calculation    Start Time  1034     Stop Time  1054     Time Calculation (min)  20 min        Session Details    Document Type  daily treatment/progress note     Mode of Treatment  occupational therapy        General Information    Patient Profile Reviewed?  yes     General Observations of Patient  Received  OOB sittining in recliner         Cognition/Psychosocial    Affect/Mental Status (Cognitive)  WFL     Orientation Status (Cognition)  oriented x 3     Follows Commands (Cognition)  WFL     Cognitive Function (Cognitive)  memory deficit        Hearing Assessment    Hearing Status  hearing impairment, bilaterally        Vision Assessment/Intervention    Visual Impairment/Limitations  --    needs eyeglasses to decr double vision        Sensory Assessment (Somatosensory)    Sensory Assessment (Somatosensory)  UE sensation intact        Range of Motion (ROM)    Range of Motion  ROM is WFL;bilateral upper extremities        Strength (Manual Muscle Testing)    Strength (Manual Muscle Testing)  strength is WFL        Bed Mobility    [place], Supine to Sit  modified independence     Comment (Bed Mobility)  flat surface         Bed to Chair Transfer    [place], Bed to Chair  modified independence     Assistive Device  walker, front-wheeled        Chair to Bed Transfer    [place], Chair to Bed  modified independence     Assistive Device  walker, front-wheeled        Sit to Stand Transfer    [place], Sit to Stand Transfer  modified independence     Assistive Device  walker, front-wheeled        Stand  to Sit Transfer    [place], Stand to Sit Transfer  modified independence     Assistive Device  walker, front-wheeled        Safety Issues, Functional Mobility    Impairments Affecting Function (Mobility)  endurance/activity tolerance        Balance    Balance Assessment  sitting static balance     Static Sitting Balance  WFL     Dynamic Sitting Balance  mild impairment     Sit to Stand Dynamic Balance  WFL     Static Standing Balance  WFL     Dynamic Standing Balance  WFL        Lower Body Dressing    [place] Assistance  dons/doffs left sock;dons/doffs right sock     [place]  distant supervision     Position  supported sitting        AM-PAC (TM) - ADL (Current Function)    Putting on and taking off regular lower body clothing?  3 - A Little     Bathing?  3 - A Little     Toileting?  3 - A Little     Putting on/taking off regular upper body clothing?  3 - A Little     How much help for taking care of personal grooming?  3 - A Little     Eating meals?  4 - None     AM-PAC (TM) ADL Score  19        Therapy Assessment/Plan (OT)    Rehab Potential (OT)  good, to achieve stated therapy goals     Therapy Frequency (OT)  3-5 times/wk     Criteria for Skilled Therapeutic Interventions Met (OT)  meets criteria     Problem List (OT)  strength     OT Diagnosis  C3-5 ACDF     Patient/Family Therapy Goal Statement (OT)  d/c home w/ family        Progress Summary (OT)    Daily Outcome Statement (OT)  [facility] 19 : Patient is making gains w/ his LE adl's,  flat bed mobility , STS plus Rw mobility activities  w/ incr strength /endurance         Therapy Plan Review/Discharge Plan (OT)    OT Recommended Discharge Disposition  home with assist;home with home health     Anticipated Equipment Needs At Discharge (OT)  walker, front-wheeled                   Education provided this session. See the Patient Education summary report for full details.         OT Goals      Most Recent Value   Bed Mobility Goal 1   Activity/Assistive  Device  sit to supine, supine to sit at 12/22/2020 1034   [place]  modified independence at 12/22/2020 1034   Time Frame  by discharge at 12/22/2020 1034   Progress/Outcome  goal met at 12/22/2020 1034   Transfer Goal 1   Activity/Assistive Device  sit-to-stand/stand-to-sit, bed-to-chair/chair-to-bed, walker, front-wheeled at 12/22/2020 1034   [place]  modified independence at 12/22/2020 1034   Time Frame  by discharge at 12/22/2020 1034   Progress/Outcome  goal met at 12/22/2020 1034   Dressing Goal 1   Activity/Adaptive Equipment  lower body dressing at 12/22/2020 1034   [place]  supervision required at 12/22/2020 1034   Time Frame  by discharge at 12/22/2020 1034   Progress/Outcome  goal met at 12/22/2020 1034

## 2020-12-22 NOTE — PLAN OF CARE
Problem: Adult Inpatient Plan of Care  Goal: Plan of Care Review  Outcome: Progressing  Flowsheets (Taken 12/22/2020 1336)  Progress: improving  Plan of Care Reviewed With: patient  Outcome Summary: LILLY reviewed chart and s/w LILLY Ayon per care progression rounds, pts anticipated DC later today 12/22/20.     Problem: Adult Inpatient Plan of Care  Goal: Readiness for Transition of Care  Intervention: Mutually Develop Transition Plan  Flowsheets (Taken 12/22/2020 1336)  Anticipated Discharge Disposition: home with assistance  Equipment Needed After Discharge: none  Assistive Device/Animal Currently Used at Home: (pt has cane and RW from broken ankle 5 MOS ago but no longer needs them) other (see comments)  Anticipated Changes Related to Illness: none  Transportation Concerns: car, none  Readmission Within the Last 30 Days: no previous admission in last 30 days  Patient/Family Anticipated Services at Transition: none  Patient/Family Anticipates Transition to: home with family  Transportation Anticipated: (dtr to transport) family or friend will provide  Concerns to be Addressed:   care coordination/care conferences   discharge planning     LILLY met with pt at bedside to introduce care coordination role. Pt AOx4 and very pleasant. Pt aware he is anticipated to DC later today. Pt resides in [facility] with 0 MARIO and 1st [place] set up with his dtr, son-in-law, and 3 adult grandchildren (25 YRS and up). Pt is a  and works part time teaching people to fly. PCP: Dr Florinda Robles. Rx: [facility] on [address] in [place]. Home Health: h/o post-broke ankle. Pt declines need for additional assistance. Pt appreciated of MIKAELA-ANGELIKA assistance. Care Coordination to remain available, if needed.    DISPOSITION: Pt to return home with family and assistance  - Anticipate dtr to transport  - No DC needs identified

## 2020-12-22 NOTE — DISCHARGE INSTRUCTIONS
Dr. Delcid's office already e-prescibed norco, colace, baclofen.     Compa Delcid MD  , Orthopaedic Spine Surgery  [facility]  Eva Arboleda PA-C  Office: [phone]  Fax: [phone]    Anterior Cervical Diskectomy and Fusion (ACDF)  Preoperative, Postoperative and Home Recovery Instructions   Introduction  The purpose of this guide is to provide you and your family with information regarding your medical condition and planned surgery. This information is part of your medical “Informed Consent”. Please read it and follow the advice carefully. You should retain the guide for future reference and bring it with you to office appointments and to the hospital for reference.  Family Waiting  After surgery, Dr. Delcid will meet with your family in the surgical waiting room unless otherwise arranged. Please have a representative available in the waiting room to gather the family upon completion of surgery.  After Your Operation  Pain  After surgery you may experience pain in the region of the incision. Some neck and arm pain as well as tingling or numbness may also be present. Initially it may be of greater intensity than pre-operatively, but will subside over time as the healing process occurs. This discomfort is caused from surgical retraction of tissue as well as inflammation and swelling of the previously compressed nerves.  Some patients experience a sore throat and swallowing difficulty after general anesthesia and surgery. This is from manipulation of tissue and the presence of the breathing tube for anesthesia. The sore throat usually will subside within a week. The swallowing difficulty may take longer. Using throat lozenges or lemon drops, sipping cool liquids, or sucking ice chips may soothe this pain.  Use of Pain Medication  Narcotic pain medication will be available for pain relief after surgery. Narcotics are very effective for pain relief  but may cause other side effects. The possible effects vary among patients and may include: sleepiness, nausea, constipation, flushing, sweating, and occasionally euphoria or confused feelings. If these occur notify your nurse. For your protection, you will receive narcotic medication only when you request it and if deemed medically appropriate by your physician.  Activity  Feel free to move about in your bed. The nurse or therapist will assist you in getting out of bed for a short walk a few hours after surgery. You will be instructed to be up walking every 2 to 3 hours during the day and evening. As you recover, the nurse will allow you to do this independently once you are steady and feel comfortable.  Early activity after surgery is extremely important to help prevent the complications of prolonged bed rest such as pneumonia and blood clots. It also promotes recovery, relieves muscle stiffness, allows for development of a well-organized scar, and improves your outlook.  Do not start any programs of exercise or physical therapy unless discussed with your doctor.  Diet  Your diet will begin with clear liquids, and be advanced to your normal daily diet as soon as your condition permits. Your IV will be removed as soon as we are reasonably certain it will no longer be required for medications and hydration.  Bowel and Bladder Function  During surgery you may have a catheter (tube) in your bladder to monitor your urine output. Upon its removal you may feel a stinging sensation for 2 to 3 days, which is normal. Some patients may have difficulty urinating after surgery. If this occurs, notify your nurse who may assist you in voiding techniques. This may require placing a catheter in your bladder. After surgery, constipation frequently occurs from inactivity and the side effects of pain medication. Stool softeners and laxatives will be available.  Respiratory Hygiene  Deep breathing is very important after surgery to  maintain lung expansion and reduce the risk of pneumonia. You will be provided with an incentive spirometer and instructed about its use. This device should be used every 15 to 30 minutes during your wakeful hours initially, then every 1 to 2 hours as your  Activity returns to normal. This device is yours to take home. Continue to use it at home for at least 1 week after your discharge.  Smoking is absolutely forbidden. There is clear evidence that smoking dramatically increases your risk of post-operative complications. There is also evidence that smoking adversely effects bone healing and nerve recovery. Second hand smoke also applies, so family members and friends should avoid smoking while around you.  Use of Your Neck Collar  A soft cervical collar will be applied immediately following surgery. It serves as a reminder and keeps your head supported. It also reduces discomfort and facilitates healing. This collar is for comfort only. Feel free to discontinue use after 2-3 days.  The collar should fit snugly, yet comfortably. It should allow only minimal motion. Do not “fight” the collar; cooperate with it. This will assist in bone healing and minimize neck discomfort and skin irritation.  Home Recovery  Follow-Up Appointment  Patients are generally discharged from the hospital 1 to 2 days after surgery. A follow-up appointment was made for Dr Delcid's office 2 weeks from the date of surgery. At your first follow-up visit, you will be evaluated and the incision will be checked. You will then be seen at 6 weeks, 3 months, 6 months, 1 year and 2 years from surgery. X-rays will be taken at each visit to insure appropriate healing is taking place.   Incision Care  After the 3rd post-operative day, you are encouraged to shower daily. Do not soak your incision. Pat the incision dry. Do not apply any ointments or creams. Cover daily, for the first 5 days, with a clean dressing. A supply will be provided upon discharge.  Surgical tapes or Steri-strips may be present to aid in holding the skin edges together. Allow these to fall off on their own. After five days post-operatively you no longer need to wear a bandage. NO BATHS, HOT TUBS, OR POOLS FOR 6 WEEKS AFTER SURGERY, it will increase your risk of infection.  Inflammation  Please take your temperature every afternoon for the first week after you are discharged from the hospital. Call your physician if:  • Your temperature taken by thermometer, is more than 101.5 degrees,  • Your incision becomes reddened, swollen or any drainage occurs, or  • Your pain increases out of anticipated parameters.  Nutrition  A well-balanced diet is necessary for good healing and recovery. This includes food from the four basic food groups: dairy products, meat, vegetables and fruit. Use of narcotic pain medication and prolonged rest may cause constipation. Drinking plenty of fluids and eating high fiber foods (whole grains, raw fruits and vegetables) will help regain normal bowel function.  Home Pain and Medication  When pressure is relieved from an inflamed, damaged nerve it does not recover instantaneously. The surgical procedure does not heal the nerve, only the body is capable of that. The goal of surgery is to create the best possible environment for the body to heal itself and to prevent further damage. This will take a variable length of time depending on the duration and degree of nerve damage and the body’s own healing abilities. Most of the healing occurs in the first few months. Pain, weakness, or numbness lasting more than six months will likely be permanent.  Everyone has a different pain tolerance that will dictate the amount of pain medication required. A decreased dose and less frequent use of pain medication will occur during your recovery period. A gradual weaning of medications should begin as soon as possible, generally within 2 to 4 weeks. Conservative use of narcotic pain  medication is advised. One should try non-narcotic medication, such as Tylenol and reserve narcotics for more severe pain. While using narcotic pain medication you SHOULD NOT drive. One should try non-narcotic medication, such as Tylenol and reserve narcotics for only the difficult times. Do not take anti-inflammatory medicines such as Celebrex, Ibuprofen, Meloxicam, Advil, Aleve, or Motrin, as these may affect your bone healing for 12 weeks following your surgery.  Narcotics will not be considered for refills on weekends, holidays, or at night.  Home Activity  Your recovery is an essential part of your surgical process. Following these guidelines and the instructions given to you by your physician and nurse will provide you with the best opportunity to return to your desired activities as completely as possible.  The First Week  Early to bed, late to rise and frequent rest periods throughout the day. Get at least 8 hours of sleep each night. A disrupted sleep pattern is common after discharge from the hospital and will return to normal over time.   You may not drive, but you may be driven, for short distances, using proper restraints such as shoulder and lap belts for 2-4 WEEKS.   No lifting of more than 10 pounds.   May climb stairs with hand rail   Begin a daily walking program with 1 to 2 blocks initially; schedule a daily time and increase distance daily.   Eat a regular, balanced diet.   Take medications as prescribed, using narcotics as needed.  The Second Week  Resume normal rising and retiring schedule, but continue to rest throughout the day.   You may not drive.   No lifting of anything weighing more than 10 pounds.   May climb stairs with hand rail   Continue scheduled walking, increasing distance and frequency as able.   May resume sexual relations when comfortable.   Begin narcotic weaning as pain diminishes, relying mainly on non-narcotic medications   Follow-up in the office with your physician or  nurse, as scheduled, for further instructions.  The Third Week  Resume normal rising and retiring schedule, resting as needed.   May resume light work around the home; lifting not to exceed 10 pounds.   Continue scheduled walking.  The Fourth Week  Resume normal rising and retiring schedule, resting as needed.   May resume light work around the home; lifting not to exceed 10 pounds.   Continue scheduled walking.  Disability  The usual period of recovery for cervical disc surgery is 8 to 12 weeks and complete healing may take from 3 to 6 months. Some patients may return to work sooner than others depending on their job, response to surgery, and ability to perform other lighter tasks in the work place. Physician approval is required prior to returning to work.  If your employer requires documentation of your work status, our office will provide the necessary information to your employer or other concerned parties. All disability matters may be handled by contacting our office. A mild physical therapy program may be initiated after 6 weeks. This will depend on how your fusion is healing. A more aggressive physical therapy regimen will be initiated on follow-up visits dependent on x-ray verification of your fusion status.

## 2020-12-22 NOTE — NURSING NOTE
Pt q4 bladder scan for >800ml. Pt had earlier voided 675 ml this shift. Attempted to have pt sit on side of bed to void, but very lethargic, though able to help position self. Unable to void after several minutes. Ortho resident Susanne called, ok to st cath pt now.    0200- pt st cath for 1000ml, DTV 10 am, tolerated well

## 2020-12-22 NOTE — NURSING NOTE
Upon entering pts room, pt was noted to have labored breathing and AMS. Pt could answer a few questions but was very lethargic, some questions answered incorrectly. VSS, SPO2 85% on RA, placed on 2L NC. Stroke alert called and ortho resident on call paged.  In the mean time, EKG and Blood sugar obtained. Orders from Dr Arroyo for narcan received and carried out. After narcan, pt seemed more alert, oriented, and arousable, but quickly drifts off to sleep.  Per Dr Arroyo, keep pt on O2 overnight, place on cardiac monitor, and give neb tx as needed, and to avoid further narcotic pain medicine

## 2020-12-22 NOTE — PLAN OF CARE
Problem: Adult Inpatient Plan of Care  Goal: Plan of Care Review  Outcome: Progressing  Flowsheets (Taken 12/22/2020 8779)  Plan of Care Reviewed With: patient  Outcome Summary:   12/22 Pt seen for follow up PT session   Patient able to recall spinal precautions and maintains throughout session   demonstrate good safety awareness and complete all functional tasks without assist   patient completed therapy goals, no further skilled PT needs identified

## 2020-12-22 NOTE — PROGRESS NOTES
Responded to stroke alert  Which was called for altered  mental status.Vitals , Bp 140/70   Spo2  on room air was 85%,  Pt seen and examined, following commands but mild lethargic.  Knows he is in the hospital had surgery this morning.  Denies any chest pain or shortness of breath.  No focal weakness.  Patient got 15 mg of oxycodone since 9:00 pm.    On exam he is alert awake, mild lethargic   Eyes pupils equal reactive  Head atraumatic normocephalic   Neck anterior dressing present  Heart sounds S1-S2 regular rate rhythm  Lungs diminished breath sounds scattered wheeze  Abdomen is soft nontender  Extremities no edema  CNS examination no gross focal neurological deficit  Strength good in all 4 extremities.  Face symmetric  No facial droop  Speech clear    All labs reviewed  EKG personally reviewed shows sinus tachycardia 101.    Assessment and plan  Altered mental status-likely from opioid and mild hypoxia  She was given a small dose of Narcan 0.1 mg with improvement is in mental status.  Per RN mental status at baseline after Narcan was given.  Would hold further  Narcotic pain meds.      Continue oxygen, patient currently placed on 5 L with oxygen saturations 95%. Wean As tolerated.  Monitor pulse ox  Discussed with RN  Will place him on cardiac monitor  Albuterol as needed    Critical care time spent 40 minutes

## 2020-12-22 NOTE — PROGRESS NOTES
Hospital Medicine Service -  Daily Progress Note       SUBJECTIVE   Interval History: pt seen and examined. Resting in chair. No chest pain/dyspnea. No n/v/abd pain. No fever/chills. No cough. Pt reports feeling good overall today.      OBJECTIVE      Vital signs in last 24 hours:  Temp:  [36.3 °C (97.3 °F)-37.2 °C (99 °F)] 36.8 °C (98.3 °F)  Heart Rate:  [80-99] 83  Resp:  [16-21] 18  BP: (117-155)/(57-76) 117/57    Intake/Output Summary (Last 24 hours) at 12/22/2020 1434  Last data filed at 12/22/2020 1119  Gross per 24 hour   Intake 80 ml   Output 2750 ml   Net -2670 ml       PHYSICAL EXAMINATION      Physical Exam  Constitutional:       Appearance: He is not diaphoretic.   Eyes:      General: No scleral icterus.  Cardiovascular:      Rate and Rhythm: Regular rhythm.      Heart sounds: No friction rub. No gallop.    Pulmonary:      Effort: Pulmonary effort is normal. No respiratory distress.      Breath sounds: Normal breath sounds. No stridor. No wheezing, rhonchi or rales.   Abdominal:      General: Bowel sounds are normal. There is no distension.      Palpations: Abdomen is soft.      Tenderness: There is no abdominal tenderness. There is no guarding.   Neurological:      Mental Status: He is alert and oriented to person, place, and time. Mental status is at baseline.   Psychiatric:         Mood and Affect: Mood normal.         Behavior: Behavior normal.         Thought Content: Thought content normal.         Judgment: Judgment normal.        LINES, CATHETERS, DRAINS, AIRWAYS, AND WOUNDS   Lines, Drains, Airways, Wounds:  Surgical Incision Neck (Active)   Number of days: 1       Comments:      LABS / IMAGING / TELE      Labs  Results from last 7 days   Lab Units 12/22/20  0320   WBC K/uL 9.36   HEMOGLOBIN g/dL 12.9*   HEMATOCRIT % 40.3   PLATELETS K/uL 190     Results from last 7 days   Lab Units 12/22/20  0320   SODIUM mEQ/L 135*   POTASSIUM mEQ/L 3.7   CHLORIDE mEQ/L 101   CO2 mEQ/L 24   BUN mg/dL 16    CREATININE mg/dL 0.9   CALCIUM mg/dL 7.9*   GLUCOSE mg/dL 198*       IMAGING  X-ray Cervical Spine 2 Or 3 Views    Result Date: 12/21/2020  IMPRESSION: Anterior fusion C3-C5. COMMENT: Two images obtained intraoperatively demonstrate anterior fusion from C3 through C5. REFERENCE AIR KERMA: 1.06 mGy.    Fl Fluoroscopy Technical Assistance    Result Date: 12/21/2020  IMPRESSION: Anterior fusion C3-C5. COMMENT: Two images obtained intraoperatively demonstrate anterior fusion from C3 through C5. REFERENCE AIR KERMA: 1.06 mGy.      ASSESSMENT AND PLAN      Status post cervical spinal fusion  Assessment & Plan  s/p ACDF C3-5  Cont w pain management. rec PT/OT eval and treat. Further management per ortho surgery team + monitor. rec DVT proph per ortho rec.      Altered mental status  Assessment & Plan  Overnight events noted and I d/w dr. ewing from [facility] this morning  Pt's mental status back to baseline now  No neuro changes from baseline. AAO X3. Pt reports feeling good overall.   AMS most likely related to narcotics. Recommend cautious use of narcotics.     Hyperglycemia  Assessment & Plan  Monitor accu checks w SSI  Cont w amaryl.     Hyperlipidemia  Assessment & Plan  Cont w statin.     HTN (hypertension)  Assessment & Plan  Cont w ace-I/bb and monitor.     Memory disorder  Assessment & Plan  Cont w supportive care.   Cont w namenda.          VTE Prophylaxis Plan: Pharmacological DVT prophylaxis and timing of such per the discretion of the surgeon. Strongly recommend maintain sequential compression device  Code Status: Full Code  Estimated Discharge Date: 12/22/2020     Joselo Aranda DO  12/22/2020

## 2020-12-22 NOTE — NURSING NOTE
Upon emptying pts drain, noticed drain no longer in pt's neck. Alerted ortho resident on call, she stated this is ok given small amount of drainage and that the end of tubing appears whole.

## 2020-12-22 NOTE — PLAN OF CARE
Problem: Adult Inpatient Plan of Care  Goal: Plan of Care Review  Outcome: Progressing  Flowsheets (Taken 12/22/2020 1034)  Progress: improving  Plan of Care Reviewed With: patient  Outcome Summary: Patient is making gains w/ his flat bed mobility , supported sitting LE adl's, STS plus Rw mobility activities  w/ incr strength/endurance     Problem: Functional Ability Impaired (Spinal Surgery)  Goal: Optimal Functional Ability  Outcome: Progressing

## 2023-01-13 ENCOUNTER — NEW PATIENT COMPREHENSIVE (OUTPATIENT)
Dept: URBAN - METROPOLITAN AREA CLINIC 48 | Facility: CLINIC | Age: 83
End: 2023-01-13

## 2023-01-13 DIAGNOSIS — Z96.1: ICD-10-CM

## 2023-01-13 DIAGNOSIS — E11.9: ICD-10-CM

## 2023-01-13 DIAGNOSIS — H01.02B: ICD-10-CM

## 2023-01-13 DIAGNOSIS — E03.9: ICD-10-CM

## 2023-01-13 DIAGNOSIS — H40.013: ICD-10-CM

## 2023-01-13 DIAGNOSIS — H01.02A: ICD-10-CM

## 2023-01-13 PROCEDURE — 92250 FUNDUS PHOTOGRAPHY W/I&R: CPT

## 2023-01-13 PROCEDURE — 99204 OFFICE O/P NEW MOD 45 MIN: CPT

## 2023-01-13 ASSESSMENT — VISUAL ACUITY
OS_CC: 20/20
OD_SC: 20/25
OD_CC: 20/20
OS_SC: 20/20

## 2023-01-13 ASSESSMENT — TONOMETRY
OD_IOP_MMHG: 10
OS_IOP_MMHG: 12

## 2023-07-21 ENCOUNTER — FOLLOW UP (OUTPATIENT)
Dept: URBAN - METROPOLITAN AREA CLINIC 48 | Facility: CLINIC | Age: 83
End: 2023-07-21

## 2023-07-21 DIAGNOSIS — H40.013: ICD-10-CM

## 2023-07-21 PROCEDURE — 92083 EXTENDED VISUAL FIELD XM: CPT

## 2023-07-21 PROCEDURE — 92133 CPTRZD OPH DX IMG PST SGM ON: CPT

## 2023-07-21 PROCEDURE — 92012 INTRM OPH EXAM EST PATIENT: CPT

## 2023-07-21 ASSESSMENT — TONOMETRY
OD_IOP_MMHG: 12
OD_IOP_MMHG: 9
OS_IOP_MMHG: 11
OS_IOP_MMHG: 13

## 2023-07-21 ASSESSMENT — VISUAL ACUITY
OS_SC: 20/20
OD_SC: 20/25

## 2024-03-11 ENCOUNTER — ESTABLISHED COMPREHENSIVE EXAM (OUTPATIENT)
Dept: URBAN - METROPOLITAN AREA CLINIC 48 | Facility: CLINIC | Age: 84
End: 2024-03-11

## 2024-03-11 DIAGNOSIS — H53.2: ICD-10-CM

## 2024-03-11 DIAGNOSIS — Z96.1: ICD-10-CM

## 2024-03-11 DIAGNOSIS — H40.013: ICD-10-CM

## 2024-03-11 DIAGNOSIS — H53.461: ICD-10-CM

## 2024-03-11 DIAGNOSIS — E11.9: ICD-10-CM

## 2024-03-11 PROCEDURE — 92250 FUNDUS PHOTOGRAPHY W/I&R: CPT

## 2024-03-11 PROCEDURE — 92083 EXTENDED VISUAL FIELD XM: CPT

## 2024-03-11 PROCEDURE — 92014 COMPRE OPH EXAM EST PT 1/>: CPT

## 2024-03-11 ASSESSMENT — VISUAL ACUITY
OS_CC: 20/25
OS_SC: 20/20-1
OD_SC: 20/20-1
OD_CC: 20/20-1
OS_CC: 20/20
OD_CC: 20/30

## 2024-03-11 ASSESSMENT — TONOMETRY
OS_IOP_MMHG: 11
OD_IOP_MMHG: 11

## 2024-09-09 ENCOUNTER — IOP CHECK (OUTPATIENT)
Dept: URBAN - METROPOLITAN AREA CLINIC 48 | Facility: CLINIC | Age: 84
End: 2024-09-09

## 2024-09-09 ASSESSMENT — TONOMETRY
OS_IOP_MMHG: 10
OS_IOP_MMHG: 12
OD_IOP_MMHG: 12
OD_IOP_MMHG: 9

## 2024-09-09 ASSESSMENT — VISUAL ACUITY
OD_SC: 20/20-2
OS_SC: 20/20

## (undated) DEVICE — TUBING SMOKE EVAC PENCIL COATED

## (undated) DEVICE — ***USE 138472*** SUTURE ETHILON 2-0   1697H

## (undated) DEVICE — SECTO TRIANGL DISCTOR/480

## (undated) DEVICE — SPONGE GAUZE 4X4 4 PLY-2S

## (undated) DEVICE — APPLICATOR CHLORAPREP 26ML ORANGE TINT

## (undated) DEVICE — BAG DECANTER

## (undated) DEVICE — BRUSH SCRUB WITH HIBICLENS

## (undated) DEVICE — STERISTRIP 1/2INX4IN

## (undated) DEVICE — PAD GROUND ELECTROSURGICAL W/CORD

## (undated) DEVICE — GLOVE SZ 7.5 PROTEXIS CLASSIC LATEX

## (undated) DEVICE — Device

## (undated) DEVICE — TIP BOVIE BLADE COATED INSULATED 2.75IN

## (undated) DEVICE — COVER MAYO STAND

## (undated) DEVICE — KIT EVAC DRAIN 10FR 400CC 1/8IN

## (undated) DEVICE — PACK RFID LAMINECTOMY

## (undated) DEVICE — MASTISOL LIQUID ADHESIVE

## (undated) DEVICE — *T* 3.0MM MATCH HEAD PRECISION NEURO BUR

## (undated) DEVICE — COVER LIGHTHANDLE

## (undated) DEVICE — BLANKET LOWER BODY

## (undated) DEVICE — HEEL  ELBOW PROTECTOR

## (undated) DEVICE — FORCEP BAYONET BIPOLAR 8"

## (undated) DEVICE — SOLN IRRIG STER WATER 1000ML

## (undated) DEVICE — MANIFOLD FOUR PORT NEPTUNE

## (undated) DEVICE — DRAPE HALF STERILE

## (undated) DEVICE — ***USE 135482*** SUTURE UMBILICAL TAPE 36IN

## (undated) DEVICE — GOWN SURGICAL REINFORCED X-LAR

## (undated) DEVICE — ***USE 57698*** SLEEVE FLOWTRON DVT CALF SINGLE USE

## (undated) DEVICE — COVER BACK TABLE ZONE-REINFORC

## (undated) DEVICE — GLOVE SZ 8 LINER PROTEXIS PI BL

## (undated) DEVICE — SUTURE VICRYL 3-0  J864D CR SH UNDYED

## (undated) DEVICE — SOLN IRRIG .9%SOD 500ML

## (undated) DEVICE — ***USE 138090*** COTTONOIDS 1/2 X 1

## (undated) DEVICE — DRAPE C-ARM X-RAY EQUIPMENT IMAGE

## (undated) DEVICE — ***USE 138505*** SUTURE PROLENE 3-0  8663G

## (undated) DEVICE — DRESSING SPONGE 4X4 SOF-WICK

## (undated) DEVICE — PIN DISTRACTION STERILE CASPAR 14MM

## (undated) DEVICE — COLLAR CERV SOFT MEDIUM

## (undated) DEVICE — BLADE SCALPEL #15

## (undated) DEVICE — SEALANT SURGICAL FLOSEAL 5ML STERILE PREP RECOTHERM